# Patient Record
Sex: FEMALE | Race: OTHER | NOT HISPANIC OR LATINO
[De-identification: names, ages, dates, MRNs, and addresses within clinical notes are randomized per-mention and may not be internally consistent; named-entity substitution may affect disease eponyms.]

---

## 2018-08-03 PROBLEM — Z00.00 ENCOUNTER FOR PREVENTIVE HEALTH EXAMINATION: Status: ACTIVE | Noted: 2018-08-03

## 2018-08-07 ENCOUNTER — APPOINTMENT (OUTPATIENT)
Dept: SURGERY | Facility: CLINIC | Age: 48
End: 2018-08-07
Payer: MEDICAID

## 2018-08-07 VITALS
WEIGHT: 213 LBS | BODY MASS INDEX: 34.23 KG/M2 | HEIGHT: 66 IN | HEART RATE: 62 BPM | DIASTOLIC BLOOD PRESSURE: 91 MMHG | TEMPERATURE: 98.3 F | SYSTOLIC BLOOD PRESSURE: 145 MMHG

## 2018-08-07 DIAGNOSIS — C50.919 MALIGNANT NEOPLASM OF UNSPECIFIED SITE OF UNSPECIFIED FEMALE BREAST: ICD-10-CM

## 2018-08-07 LAB
BUN SERPL-MCNC: 6 MG/DL
CREAT SERPL-MCNC: 0.8 MG/DL

## 2018-08-07 PROCEDURE — 99215 OFFICE O/P EST HI 40 MIN: CPT

## 2018-08-07 RX ORDER — CHOLECALCIFEROL (VITAMIN D3) 25 MCG
TABLET ORAL
Refills: 0 | Status: ACTIVE | COMMUNITY

## 2018-08-17 ENCOUNTER — OUTPATIENT (OUTPATIENT)
Dept: OUTPATIENT SERVICES | Facility: HOSPITAL | Age: 48
LOS: 1 days | End: 2018-08-17
Payer: MEDICAID

## 2018-08-17 VITALS
TEMPERATURE: 98 F | HEIGHT: 66 IN | SYSTOLIC BLOOD PRESSURE: 143 MMHG | RESPIRATION RATE: 16 BRPM | OXYGEN SATURATION: 99 % | HEART RATE: 63 BPM | DIASTOLIC BLOOD PRESSURE: 91 MMHG | WEIGHT: 207.01 LBS

## 2018-08-17 DIAGNOSIS — C50.912 MALIGNANT NEOPLASM OF UNSPECIFIED SITE OF LEFT FEMALE BREAST: ICD-10-CM

## 2018-08-17 DIAGNOSIS — Z98.890 OTHER SPECIFIED POSTPROCEDURAL STATES: Chronic | ICD-10-CM

## 2018-08-17 DIAGNOSIS — Z90.49 ACQUIRED ABSENCE OF OTHER SPECIFIED PARTS OF DIGESTIVE TRACT: Chronic | ICD-10-CM

## 2018-08-17 DIAGNOSIS — I10 ESSENTIAL (PRIMARY) HYPERTENSION: ICD-10-CM

## 2018-08-17 DIAGNOSIS — R89.7 ABNORMAL HISTOLOGICAL FINDINGS IN SPECIMENS FROM OTHER ORGANS, SYSTEMS AND TISSUES: Chronic | ICD-10-CM

## 2018-08-17 DIAGNOSIS — Z01.818 ENCOUNTER FOR OTHER PREPROCEDURAL EXAMINATION: ICD-10-CM

## 2018-08-17 DIAGNOSIS — Z98.891 HISTORY OF UTERINE SCAR FROM PREVIOUS SURGERY: Chronic | ICD-10-CM

## 2018-08-17 LAB
ALBUMIN SERPL ELPH-MCNC: 3.8 G/DL — SIGNIFICANT CHANGE UP (ref 3.5–5)
ALP SERPL-CCNC: 63 U/L — SIGNIFICANT CHANGE UP (ref 40–120)
ALT FLD-CCNC: 27 U/L DA — SIGNIFICANT CHANGE UP (ref 10–60)
ANION GAP SERPL CALC-SCNC: 8 MMOL/L — SIGNIFICANT CHANGE UP (ref 5–17)
APTT BLD: 29.4 SEC — SIGNIFICANT CHANGE UP (ref 27.5–37.4)
AST SERPL-CCNC: 18 U/L — SIGNIFICANT CHANGE UP (ref 10–40)
BILIRUB SERPL-MCNC: 0.5 MG/DL — SIGNIFICANT CHANGE UP (ref 0.2–1.2)
BUN SERPL-MCNC: 6 MG/DL — LOW (ref 7–18)
CALCIUM SERPL-MCNC: 9.1 MG/DL — SIGNIFICANT CHANGE UP (ref 8.4–10.5)
CHLORIDE SERPL-SCNC: 108 MMOL/L — SIGNIFICANT CHANGE UP (ref 96–108)
CO2 SERPL-SCNC: 25 MMOL/L — SIGNIFICANT CHANGE UP (ref 22–31)
CREAT SERPL-MCNC: 0.9 MG/DL — SIGNIFICANT CHANGE UP (ref 0.5–1.3)
GLUCOSE SERPL-MCNC: 84 MG/DL — SIGNIFICANT CHANGE UP (ref 70–99)
HCG SERPL-ACNC: <1 MIU/ML — SIGNIFICANT CHANGE UP
HCT VFR BLD CALC: 36.3 % — SIGNIFICANT CHANGE UP (ref 34.5–45)
HGB BLD-MCNC: 11 G/DL — LOW (ref 11.5–15.5)
INR BLD: 1.08 RATIO — SIGNIFICANT CHANGE UP (ref 0.88–1.16)
MCHC RBC-ENTMCNC: 22.1 PG — LOW (ref 27–34)
MCHC RBC-ENTMCNC: 30.3 GM/DL — LOW (ref 32–36)
MCV RBC AUTO: 72.9 FL — LOW (ref 80–100)
PLATELET # BLD AUTO: 569 K/UL — HIGH (ref 150–400)
POTASSIUM SERPL-MCNC: 4.1 MMOL/L — SIGNIFICANT CHANGE UP (ref 3.5–5.3)
POTASSIUM SERPL-SCNC: 4.1 MMOL/L — SIGNIFICANT CHANGE UP (ref 3.5–5.3)
PROT SERPL-MCNC: 7.5 G/DL — SIGNIFICANT CHANGE UP (ref 6–8.3)
PROTHROM AB SERPL-ACNC: 11.8 SEC — SIGNIFICANT CHANGE UP (ref 9.8–12.7)
RBC # BLD: 4.98 M/UL — SIGNIFICANT CHANGE UP (ref 3.8–5.2)
RBC # FLD: 16.1 % — HIGH (ref 10.3–14.5)
SODIUM SERPL-SCNC: 141 MMOL/L — SIGNIFICANT CHANGE UP (ref 135–145)
WBC # BLD: 8.7 K/UL — SIGNIFICANT CHANGE UP (ref 3.8–10.5)
WBC # FLD AUTO: 8.7 K/UL — SIGNIFICANT CHANGE UP (ref 3.8–10.5)

## 2018-08-17 PROCEDURE — 71046 X-RAY EXAM CHEST 2 VIEWS: CPT | Mod: 26

## 2018-08-17 RX ORDER — SODIUM CHLORIDE 9 MG/ML
3 INJECTION INTRAMUSCULAR; INTRAVENOUS; SUBCUTANEOUS EVERY 8 HOURS
Refills: 0 | Status: DISCONTINUED | OUTPATIENT
Start: 2018-09-14 | End: 2018-09-22

## 2018-08-17 NOTE — H&P PST ADULT - PROBLEM SELECTOR PLAN 1
Scheduled for left breast lumpectomy with preoperative needle localization and axillary sentinel node biopsy on 8/22/2018

## 2018-08-17 NOTE — H&P PST ADULT - NSANTHOSAYNRD_GEN_A_CORE
No. GERARDO screening performed.  STOP BANG Legend: 0-2 = LOW Risk; 3-4 = INTERMEDIATE Risk; 5-8 = HIGH Risk

## 2018-08-17 NOTE — H&P PST ADULT - ASSESSMENT
46 y/o female with PMH of hypertension (diet controlled) and obesity diagnosed with malignant neoplasm of left breast scheduled for left breast lumpectomy with preoperative needle localization and axillary sentinel node biopsy on 8/22/2018

## 2018-08-17 NOTE — H&P PST ADULT - HISTORY OF PRESENT ILLNESS
48 y/o female with PMH of     is here today for presurgical evaluation. She was diagnosed with malignant neoplasm of left breast and is scheduled for left breast lumpectomy with preoperative needle localization and axillary sentinel node biopsy on 8/22/2018 46 y/o female with PMH of hypertension and obesity is here today for presurgical evaluation. She was diagnosed with malignant neoplasm of left breast and is scheduled for left breast lumpectomy with preoperative needle localization and axillary sentinel node biopsy on 8/22/2018 48 y/o female with PMH of hypertension (diet controlled) and obesity is here today for presurgical evaluation. She was diagnosed with malignant neoplasm of left breast and is scheduled for left breast lumpectomy with preoperative needle localization and axillary sentinel node biopsy on 8/22/2018

## 2018-08-17 NOTE — H&P PST ADULT - RS GEN PE MLT RESP DETAILS PC
normal/no subcutaneous emphysema/clear to auscultation bilaterally/no wheezes/breath sounds equal/respirations non-labored/airway patent/good air movement/no chest wall tenderness/no intercostal retractions/no rales/no rhonchi

## 2018-08-17 NOTE — H&P PST ADULT - NEGATIVE GENERAL SYMPTOMS
no sweating/no anorexia/no fever/no chills/no weight loss/no weight gain/no polyuria/no malaise/no polyphagia/no polydipsia/no fatigue

## 2018-08-17 NOTE — H&P PST ADULT - PMH
Malignant neoplasm of left female breast, unspecified estrogen receptor status, unspecified site of breast Essential hypertension    Malignant neoplasm of left female breast, unspecified estrogen receptor status, unspecified site of breast
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
## 2018-08-31 ENCOUNTER — TRANSCRIPTION ENCOUNTER (OUTPATIENT)
Age: 48
End: 2018-08-31

## 2018-09-13 ENCOUNTER — TRANSCRIPTION ENCOUNTER (OUTPATIENT)
Age: 48
End: 2018-09-13

## 2018-09-14 ENCOUNTER — RESULT REVIEW (OUTPATIENT)
Age: 48
End: 2018-09-14

## 2018-09-14 ENCOUNTER — OUTPATIENT (OUTPATIENT)
Dept: OUTPATIENT SERVICES | Facility: HOSPITAL | Age: 48
LOS: 1 days | End: 2018-09-14
Payer: MEDICAID

## 2018-09-14 ENCOUNTER — APPOINTMENT (OUTPATIENT)
Dept: SURGERY | Facility: HOSPITAL | Age: 48
End: 2018-09-14

## 2018-09-14 VITALS
SYSTOLIC BLOOD PRESSURE: 155 MMHG | TEMPERATURE: 98 F | RESPIRATION RATE: 16 BRPM | HEART RATE: 59 BPM | DIASTOLIC BLOOD PRESSURE: 80 MMHG | WEIGHT: 207.01 LBS | OXYGEN SATURATION: 100 % | HEIGHT: 66 IN

## 2018-09-14 VITALS
TEMPERATURE: 98 F | HEART RATE: 70 BPM | SYSTOLIC BLOOD PRESSURE: 126 MMHG | OXYGEN SATURATION: 99 % | RESPIRATION RATE: 18 BRPM | DIASTOLIC BLOOD PRESSURE: 74 MMHG

## 2018-09-14 DIAGNOSIS — Z98.890 OTHER SPECIFIED POSTPROCEDURAL STATES: Chronic | ICD-10-CM

## 2018-09-14 DIAGNOSIS — Z01.818 ENCOUNTER FOR OTHER PREPROCEDURAL EXAMINATION: ICD-10-CM

## 2018-09-14 DIAGNOSIS — R89.7 ABNORMAL HISTOLOGICAL FINDINGS IN SPECIMENS FROM OTHER ORGANS, SYSTEMS AND TISSUES: Chronic | ICD-10-CM

## 2018-09-14 DIAGNOSIS — Z98.891 HISTORY OF UTERINE SCAR FROM PREVIOUS SURGERY: Chronic | ICD-10-CM

## 2018-09-14 DIAGNOSIS — C50.912 MALIGNANT NEOPLASM OF UNSPECIFIED SITE OF LEFT FEMALE BREAST: ICD-10-CM

## 2018-09-14 DIAGNOSIS — Z90.49 ACQUIRED ABSENCE OF OTHER SPECIFIED PARTS OF DIGESTIVE TRACT: Chronic | ICD-10-CM

## 2018-09-14 LAB — HCG UR QL: NEGATIVE — SIGNIFICANT CHANGE UP

## 2018-09-14 PROCEDURE — 38740 REMOVE ARMPIT LYMPH NODES: CPT | Mod: 59

## 2018-09-14 PROCEDURE — 19301 PARTIAL MASTECTOMY: CPT | Mod: LT

## 2018-09-14 PROCEDURE — 88333 PATH CONSLTJ SURG CYTO XM 1: CPT | Mod: 26

## 2018-09-14 PROCEDURE — 88341 IMHCHEM/IMCYTCHM EA ADD ANTB: CPT | Mod: 26

## 2018-09-14 PROCEDURE — 38790 INJECT FOR LYMPHATIC X-RAY: CPT | Mod: 59

## 2018-09-14 PROCEDURE — 78195 LYMPH SYSTEM IMAGING: CPT | Mod: 26

## 2018-09-14 PROCEDURE — 38792 RA TRACER ID OF SENTINL NODE: CPT | Mod: 59

## 2018-09-14 PROCEDURE — 88342 IMHCHEM/IMCYTCHM 1ST ANTB: CPT | Mod: 26

## 2018-09-14 PROCEDURE — 76098 X-RAY EXAM SURGICAL SPECIMEN: CPT | Mod: 26

## 2018-09-14 PROCEDURE — 38900 IO MAP OF SENT LYMPH NODE: CPT

## 2018-09-14 PROCEDURE — 19281 PERQ DEVICE BREAST 1ST IMAG: CPT | Mod: LT

## 2018-09-14 PROCEDURE — 88307 TISSUE EXAM BY PATHOLOGIST: CPT | Mod: 26

## 2018-09-14 RX ORDER — HYDROMORPHONE HYDROCHLORIDE 2 MG/ML
0.5 INJECTION INTRAMUSCULAR; INTRAVENOUS; SUBCUTANEOUS
Refills: 0 | Status: DISCONTINUED | OUTPATIENT
Start: 2018-09-14 | End: 2018-09-14

## 2018-09-14 RX ORDER — ONDANSETRON 8 MG/1
4 TABLET, FILM COATED ORAL ONCE
Refills: 0 | Status: COMPLETED | OUTPATIENT
Start: 2018-09-14 | End: 2018-09-14

## 2018-09-14 RX ORDER — SODIUM CHLORIDE 9 MG/ML
1000 INJECTION, SOLUTION INTRAVENOUS
Refills: 0 | Status: DISCONTINUED | OUTPATIENT
Start: 2018-09-14 | End: 2018-09-14

## 2018-09-14 RX ORDER — ACETAMINOPHEN 500 MG
1000 TABLET ORAL ONCE
Refills: 0 | Status: COMPLETED | OUTPATIENT
Start: 2018-09-14 | End: 2018-09-14

## 2018-09-14 RX ADMIN — ONDANSETRON 4 MILLIGRAM(S): 8 TABLET, FILM COATED ORAL at 17:06

## 2018-09-14 RX ADMIN — Medication 400 MILLIGRAM(S): at 15:06

## 2018-09-14 RX ADMIN — HYDROMORPHONE HYDROCHLORIDE 0.5 MILLIGRAM(S): 2 INJECTION INTRAMUSCULAR; INTRAVENOUS; SUBCUTANEOUS at 15:21

## 2018-09-14 RX ADMIN — Medication 1000 MILLIGRAM(S): at 15:36

## 2018-09-14 RX ADMIN — SODIUM CHLORIDE 3 MILLILITER(S): 9 INJECTION INTRAMUSCULAR; INTRAVENOUS; SUBCUTANEOUS at 09:33

## 2018-09-14 RX ADMIN — HYDROMORPHONE HYDROCHLORIDE 0.5 MILLIGRAM(S): 2 INJECTION INTRAMUSCULAR; INTRAVENOUS; SUBCUTANEOUS at 15:36

## 2018-09-14 NOTE — ASU DISCHARGE PLAN (ADULT/PEDIATRIC). - NOTIFY
Swelling that continues/Pain not relieved by Medications/Bleeding that does not stop/Fever greater than 101
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
## 2018-09-14 NOTE — BRIEF OPERATIVE NOTE - PROCEDURE
<<-----Click on this checkbox to enter Procedure Lumpectomy for malignant neoplasm of breast  09/14/2018    Active  SAMY

## 2018-09-18 ENCOUNTER — APPOINTMENT (OUTPATIENT)
Dept: SURGERY | Facility: CLINIC | Age: 48
End: 2018-09-18
Payer: MEDICAID

## 2018-09-18 PROCEDURE — 99024 POSTOP FOLLOW-UP VISIT: CPT

## 2018-09-19 LAB — SURGICAL PATHOLOGY STUDY: SIGNIFICANT CHANGE UP

## 2019-03-12 ENCOUNTER — TRANSCRIPTION ENCOUNTER (OUTPATIENT)
Age: 49
End: 2019-03-12

## 2019-07-31 ENCOUNTER — INPATIENT (INPATIENT)
Facility: HOSPITAL | Age: 49
LOS: 9 days | Discharge: HOME | DRG: 300 | End: 2019-08-10
Attending: STUDENT IN AN ORGANIZED HEALTH CARE EDUCATION/TRAINING PROGRAM | Admitting: HOSPITALIST
Payer: MEDICAID

## 2019-07-31 VITALS
RESPIRATION RATE: 17 BRPM | WEIGHT: 205.91 LBS | DIASTOLIC BLOOD PRESSURE: 83 MMHG | OXYGEN SATURATION: 99 % | SYSTOLIC BLOOD PRESSURE: 133 MMHG | HEIGHT: 66 IN | TEMPERATURE: 98 F | HEART RATE: 72 BPM

## 2019-07-31 DIAGNOSIS — R89.7 ABNORMAL HISTOLOGICAL FINDINGS IN SPECIMENS FROM OTHER ORGANS, SYSTEMS AND TISSUES: Chronic | ICD-10-CM

## 2019-07-31 DIAGNOSIS — I70.90 UNSPECIFIED ATHEROSCLEROSIS: ICD-10-CM

## 2019-07-31 DIAGNOSIS — Z98.890 OTHER SPECIFIED POSTPROCEDURAL STATES: Chronic | ICD-10-CM

## 2019-07-31 DIAGNOSIS — Z98.891 HISTORY OF UTERINE SCAR FROM PREVIOUS SURGERY: Chronic | ICD-10-CM

## 2019-07-31 DIAGNOSIS — Z90.49 ACQUIRED ABSENCE OF OTHER SPECIFIED PARTS OF DIGESTIVE TRACT: Chronic | ICD-10-CM

## 2019-07-31 LAB
ALBUMIN SERPL ELPH-MCNC: 4.1 G/DL — SIGNIFICANT CHANGE UP (ref 3.3–5)
ALP SERPL-CCNC: 47 U/L — SIGNIFICANT CHANGE UP (ref 40–120)
ALT FLD-CCNC: 19 U/L — SIGNIFICANT CHANGE UP (ref 10–45)
ANION GAP SERPL CALC-SCNC: 12 MMOL/L — SIGNIFICANT CHANGE UP (ref 5–17)
APTT BLD: 26.7 SEC — LOW (ref 27.5–36.3)
AST SERPL-CCNC: 15 U/L — SIGNIFICANT CHANGE UP (ref 10–40)
BASOPHILS # BLD AUTO: 0 K/UL — SIGNIFICANT CHANGE UP (ref 0–0.2)
BASOPHILS NFR BLD AUTO: 0.1 % — SIGNIFICANT CHANGE UP (ref 0–2)
BILIRUB SERPL-MCNC: 0.3 MG/DL — SIGNIFICANT CHANGE UP (ref 0.2–1.2)
BLD GP AB SCN SERPL QL: NEGATIVE — SIGNIFICANT CHANGE UP
BUN SERPL-MCNC: 9 MG/DL — SIGNIFICANT CHANGE UP (ref 7–23)
CALCIUM SERPL-MCNC: 9.3 MG/DL — SIGNIFICANT CHANGE UP (ref 8.4–10.5)
CHLORIDE SERPL-SCNC: 104 MMOL/L — SIGNIFICANT CHANGE UP (ref 96–108)
CO2 SERPL-SCNC: 24 MMOL/L — SIGNIFICANT CHANGE UP (ref 22–31)
CREAT SERPL-MCNC: 0.82 MG/DL — SIGNIFICANT CHANGE UP (ref 0.5–1.3)
EOSINOPHIL # BLD AUTO: 0.1 K/UL — SIGNIFICANT CHANGE UP (ref 0–0.5)
EOSINOPHIL NFR BLD AUTO: 1.4 % — SIGNIFICANT CHANGE UP (ref 0–6)
GAS PNL BLDV: SIGNIFICANT CHANGE UP
GLUCOSE SERPL-MCNC: 94 MG/DL — SIGNIFICANT CHANGE UP (ref 70–99)
HCT VFR BLD CALC: 42 % — SIGNIFICANT CHANGE UP (ref 34.5–45)
HGB BLD-MCNC: 13.8 G/DL — SIGNIFICANT CHANGE UP (ref 11.5–15.5)
INR BLD: 0.96 RATIO — SIGNIFICANT CHANGE UP (ref 0.88–1.16)
LYMPHOCYTES # BLD AUTO: 2.5 K/UL — SIGNIFICANT CHANGE UP (ref 1–3.3)
LYMPHOCYTES # BLD AUTO: 28.1 % — SIGNIFICANT CHANGE UP (ref 13–44)
MCHC RBC-ENTMCNC: 27.1 PG — SIGNIFICANT CHANGE UP (ref 27–34)
MCHC RBC-ENTMCNC: 32.8 GM/DL — SIGNIFICANT CHANGE UP (ref 32–36)
MCV RBC AUTO: 82.5 FL — SIGNIFICANT CHANGE UP (ref 80–100)
MONOCYTES # BLD AUTO: 0.7 K/UL — SIGNIFICANT CHANGE UP (ref 0–0.9)
MONOCYTES NFR BLD AUTO: 8.1 % — SIGNIFICANT CHANGE UP (ref 2–14)
NEUTROPHILS # BLD AUTO: 5.6 K/UL — SIGNIFICANT CHANGE UP (ref 1.8–7.4)
NEUTROPHILS NFR BLD AUTO: 62.4 % — SIGNIFICANT CHANGE UP (ref 43–77)
PLATELET # BLD AUTO: 382 K/UL — SIGNIFICANT CHANGE UP (ref 150–400)
POTASSIUM SERPL-MCNC: 4.3 MMOL/L — SIGNIFICANT CHANGE UP (ref 3.5–5.3)
POTASSIUM SERPL-SCNC: 4.3 MMOL/L — SIGNIFICANT CHANGE UP (ref 3.5–5.3)
PROT SERPL-MCNC: 6.8 G/DL — SIGNIFICANT CHANGE UP (ref 6–8.3)
PROTHROM AB SERPL-ACNC: 10.9 SEC — SIGNIFICANT CHANGE UP (ref 10–12.9)
RBC # BLD: 5.1 M/UL — SIGNIFICANT CHANGE UP (ref 3.8–5.2)
RBC # FLD: 13.3 % — SIGNIFICANT CHANGE UP (ref 10.3–14.5)
RH IG SCN BLD-IMP: POSITIVE — SIGNIFICANT CHANGE UP
SODIUM SERPL-SCNC: 140 MMOL/L — SIGNIFICANT CHANGE UP (ref 135–145)
WBC # BLD: 9 K/UL — SIGNIFICANT CHANGE UP (ref 3.8–10.5)
WBC # FLD AUTO: 9 K/UL — SIGNIFICANT CHANGE UP (ref 3.8–10.5)

## 2019-07-31 PROCEDURE — 93010 ELECTROCARDIOGRAM REPORT: CPT

## 2019-07-31 PROCEDURE — 71045 X-RAY EXAM CHEST 1 VIEW: CPT | Mod: 26

## 2019-07-31 PROCEDURE — 99291 CRITICAL CARE FIRST HOUR: CPT

## 2019-07-31 RX ORDER — HEPARIN SODIUM 5000 [USP'U]/ML
7500 INJECTION INTRAVENOUS; SUBCUTANEOUS ONCE
Refills: 0 | Status: COMPLETED | OUTPATIENT
Start: 2019-07-31 | End: 2019-07-31

## 2019-07-31 RX ORDER — HEPARIN SODIUM 5000 [USP'U]/ML
7500 INJECTION INTRAVENOUS; SUBCUTANEOUS EVERY 6 HOURS
Refills: 0 | Status: DISCONTINUED | OUTPATIENT
Start: 2019-07-31 | End: 2019-08-01

## 2019-07-31 RX ORDER — HEPARIN SODIUM 5000 [USP'U]/ML
3500 INJECTION INTRAVENOUS; SUBCUTANEOUS EVERY 6 HOURS
Refills: 0 | Status: DISCONTINUED | OUTPATIENT
Start: 2019-07-31 | End: 2019-08-01

## 2019-07-31 RX ORDER — HEPARIN SODIUM 5000 [USP'U]/ML
INJECTION INTRAVENOUS; SUBCUTANEOUS
Qty: 25000 | Refills: 0 | Status: DISCONTINUED | OUTPATIENT
Start: 2019-07-31 | End: 2019-08-01

## 2019-07-31 RX ADMIN — HEPARIN SODIUM 1700 UNIT(S)/HR: 5000 INJECTION INTRAVENOUS; SUBCUTANEOUS at 19:12

## 2019-07-31 RX ADMIN — HEPARIN SODIUM 7500 UNIT(S): 5000 INJECTION INTRAVENOUS; SUBCUTANEOUS at 19:12

## 2019-07-31 NOTE — ED ADULT NURSE REASSESSMENT NOTE - NS ED NURSE REASSESS COMMENT FT1
Pt is resting comfortably, VSS, skin w/d/i, NAD.  Family present at bedside.  Safety and comfort maintained.  Pt pending being admitted.  Heparin Started with 2 RNs at bedside.  Will continue to monitor.

## 2019-07-31 NOTE — ED ADULT NURSE NOTE - OBJECTIVE STATEMENT
49 y/o female PMH HTN and breast cancer presents to ED reporting R leg pain. 47 y/o female PMH HTN and breast cancer presents to ED reporting R leg pain. Pt reports having numbness and toes turning white on R leg so getting US and was sent directly to ED for evaluation. On exam, AOx3, speaking in complete sentences. Lung sounds CTA, NAD. Abdomen soft, non-tender, non-distended. Pt denies CP, SOB, n/v/d, fever/chills at this time. Unable to get doppler pulse of R foot, MD Bearden at bedside, capillary refill greater than 3 seconds. Heplock placed, labs sent. Seen and evaluated by MD.

## 2019-07-31 NOTE — ED PROVIDER NOTE - OBJECTIVE STATEMENT
47 y/o female hx breast cancer on tamoxifen who presents to the ED for abnormal outpatient US showing long segment occlusion of the mid and distal right superficial femoral artery. patient states she has been having Right foot pain and color change intermittently for about 2 months, states her toes turn white and sometimes blue. symptoms improve/resolve with rest and get worse with walking around. her OB/GYN recommended outpatient doppler which was done today. patient sent in for vascular consultation.

## 2019-07-31 NOTE — ED PROVIDER NOTE - ATTENDING CONTRIBUTION TO CARE
47 yo F presents with R lower leg intermittent numbness/coldness/paleness for 2 months, and pain with ambulation. she was told initilly it was sciatica. went to PMD for furhter workup who arranged outpatient US. US arterial showed long segment occlusion of the mid and distal R sup fem artery and R popliteal artery with collateral flow distally in the posterior tibial artery. No flow seen in the R anterior tibial, peroneal, and dorsalis pedis arteries.   RLE pain from foot to calf with ambulation, resolved with rest. numbness ad cold foot intermittently.   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 lungs CTA. abd soft and NT. no leg swelling. no palpable DP/PT pulses in R foot. R foot colder than the L foot. sensation/strength intact. 47 yo F presents with R lower leg intermittent numbness/coldness/paleness for 2 months, and pain with ambulation. she was told initilly it was sciatica. went to PMD for furhter workup who arranged outpatient US. US arterial showed long segment occlusion of the mid and distal R sup fem artery and R popliteal artery with collateral flow distally in the posterior tibial artery. No flow seen in the R anterior tibial, peroneal, and dorsalis pedis arteries.   RLE pain from foot to calf with ambulation, resolved with rest. numbness ad cold foot intermittently.   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 lungs CTA. abd soft and NT. no leg swelling. no palpable DP/PT pulses in R foot. R foot colder than the L foot. sensation/strength intact.    unable to obtain pulses with doppler. vascular surgery was consulted urgently. heparin drip was started. vascular believes that this is a chronic process and pt has developed collaterals with perfusion of foot/toes at this time, does not believe pt requires urgent OR. labs nonactionable. vs stable. surgery asked tht pt be admitted to medicine, pt was admitted in stable condition    I reviewed all lab and imaging results from this ED visit, and discussed ALL results with the patient and/or family, including all abnormal results and incidental findings. I addressed all of patient's/family's questions and concerns, and I recommended appropriate follow up for all incidental findings. Based on patient's HPI as well as the results of today's workup, I felt that patient warranted admission to the hospital for further workup/evaluation and continued management. The patient was agreeable with admission. Patient was signed out to admitting team. Admitting team accepted the patient for admission and subsequently took over the patient's care in its entirety.

## 2019-07-31 NOTE — ED PROVIDER NOTE - PROGRESS NOTE DETAILS
vascular surgery feels patient should have heparin and then hypercoagulable/vasculitis workup with heme/onc eval. - Kyung Lai PA-C

## 2019-07-31 NOTE — ED PROVIDER NOTE - CRITICAL CARE PROVIDED
direct patient care (not related to procedure)/additional history taking/documentation/consultation with other physicians/interpretation of diagnostic studies
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
## 2019-08-01 ENCOUNTER — OUTPATIENT (OUTPATIENT)
Dept: OUTPATIENT SERVICES | Facility: HOSPITAL | Age: 49
LOS: 1 days | End: 2019-08-01
Payer: MEDICAID

## 2019-08-01 DIAGNOSIS — R89.7 ABNORMAL HISTOLOGICAL FINDINGS IN SPECIMENS FROM OTHER ORGANS, SYSTEMS AND TISSUES: Chronic | ICD-10-CM

## 2019-08-01 DIAGNOSIS — Z98.890 OTHER SPECIFIED POSTPROCEDURAL STATES: Chronic | ICD-10-CM

## 2019-08-01 DIAGNOSIS — I70.90 UNSPECIFIED ATHEROSCLEROSIS: ICD-10-CM

## 2019-08-01 DIAGNOSIS — Z90.49 ACQUIRED ABSENCE OF OTHER SPECIFIED PARTS OF DIGESTIVE TRACT: Chronic | ICD-10-CM

## 2019-08-01 DIAGNOSIS — I10 ESSENTIAL (PRIMARY) HYPERTENSION: ICD-10-CM

## 2019-08-01 DIAGNOSIS — Z29.9 ENCOUNTER FOR PROPHYLACTIC MEASURES, UNSPECIFIED: ICD-10-CM

## 2019-08-01 DIAGNOSIS — Z98.891 HISTORY OF UTERINE SCAR FROM PREVIOUS SURGERY: Chronic | ICD-10-CM

## 2019-08-01 DIAGNOSIS — C50.912 MALIGNANT NEOPLASM OF UNSPECIFIED SITE OF LEFT FEMALE BREAST: ICD-10-CM

## 2019-08-01 LAB
ALBUMIN SERPL ELPH-MCNC: 3.8 G/DL — SIGNIFICANT CHANGE UP (ref 3.3–5)
ALP SERPL-CCNC: 44 U/L — SIGNIFICANT CHANGE UP (ref 40–120)
ALT FLD-CCNC: 19 U/L — SIGNIFICANT CHANGE UP (ref 10–45)
ANION GAP SERPL CALC-SCNC: 14 MMOL/L — SIGNIFICANT CHANGE UP (ref 5–17)
APPEARANCE UR: ABNORMAL
APTT BLD: 106.4 SEC — HIGH (ref 27.5–36.3)
APTT BLD: 66.3 SEC — HIGH (ref 27.5–36.3)
APTT BLD: 77.1 SEC — HIGH (ref 27.5–36.3)
AST SERPL-CCNC: 18 U/L — SIGNIFICANT CHANGE UP (ref 10–40)
BILIRUB SERPL-MCNC: 0.4 MG/DL — SIGNIFICANT CHANGE UP (ref 0.2–1.2)
BILIRUB UR-MCNC: NEGATIVE — SIGNIFICANT CHANGE UP
BUN SERPL-MCNC: 7 MG/DL — SIGNIFICANT CHANGE UP (ref 7–23)
CALCIUM SERPL-MCNC: 9.4 MG/DL — SIGNIFICANT CHANGE UP (ref 8.4–10.5)
CHLORIDE SERPL-SCNC: 104 MMOL/L — SIGNIFICANT CHANGE UP (ref 96–108)
CO2 SERPL-SCNC: 23 MMOL/L — SIGNIFICANT CHANGE UP (ref 22–31)
COLOR SPEC: SIGNIFICANT CHANGE UP
CREAT SERPL-MCNC: 0.8 MG/DL — SIGNIFICANT CHANGE UP (ref 0.5–1.3)
DIFF PNL FLD: NEGATIVE — SIGNIFICANT CHANGE UP
GLUCOSE SERPL-MCNC: 91 MG/DL — SIGNIFICANT CHANGE UP (ref 70–99)
GLUCOSE UR QL: NEGATIVE — SIGNIFICANT CHANGE UP
HCG UR QL: NEGATIVE — SIGNIFICANT CHANGE UP
HCT VFR BLD CALC: 39.6 % — SIGNIFICANT CHANGE UP (ref 34.5–45)
HCT VFR BLD CALC: 41.3 % — SIGNIFICANT CHANGE UP (ref 34.5–45)
HCYS SERPL-MCNC: 7.1 UMOL/L — SIGNIFICANT CHANGE UP
HGB BLD-MCNC: 13.3 G/DL — SIGNIFICANT CHANGE UP (ref 11.5–15.5)
HGB BLD-MCNC: 13.6 G/DL — SIGNIFICANT CHANGE UP (ref 11.5–15.5)
HIV 1+2 AB+HIV1 P24 AG SERPL QL IA: SIGNIFICANT CHANGE UP
KETONES UR-MCNC: NEGATIVE — SIGNIFICANT CHANGE UP
LEUKOCYTE ESTERASE UR-ACNC: ABNORMAL
MCHC RBC-ENTMCNC: 26.9 PG — LOW (ref 27–34)
MCHC RBC-ENTMCNC: 27.4 PG — SIGNIFICANT CHANGE UP (ref 27–34)
MCHC RBC-ENTMCNC: 32.8 GM/DL — SIGNIFICANT CHANGE UP (ref 32–36)
MCHC RBC-ENTMCNC: 33.5 GM/DL — SIGNIFICANT CHANGE UP (ref 32–36)
MCV RBC AUTO: 82 FL — SIGNIFICANT CHANGE UP (ref 80–100)
MCV RBC AUTO: 82 FL — SIGNIFICANT CHANGE UP (ref 80–100)
NITRITE UR-MCNC: NEGATIVE — SIGNIFICANT CHANGE UP
PH UR: 6 — SIGNIFICANT CHANGE UP (ref 5–8)
PLATELET # BLD AUTO: 334 K/UL — SIGNIFICANT CHANGE UP (ref 150–400)
PLATELET # BLD AUTO: 372 K/UL — SIGNIFICANT CHANGE UP (ref 150–400)
POTASSIUM SERPL-MCNC: 4.3 MMOL/L — SIGNIFICANT CHANGE UP (ref 3.5–5.3)
POTASSIUM SERPL-SCNC: 4.3 MMOL/L — SIGNIFICANT CHANGE UP (ref 3.5–5.3)
PROT SERPL-MCNC: 6.8 G/DL — SIGNIFICANT CHANGE UP (ref 6–8.3)
PROT UR-MCNC: NEGATIVE — SIGNIFICANT CHANGE UP
RBC # BLD: 4.83 M/UL — SIGNIFICANT CHANGE UP (ref 3.8–5.2)
RBC # BLD: 5.04 M/UL — SIGNIFICANT CHANGE UP (ref 3.8–5.2)
RBC # FLD: 13.2 % — SIGNIFICANT CHANGE UP (ref 10.3–14.5)
RBC # FLD: 13.2 % — SIGNIFICANT CHANGE UP (ref 10.3–14.5)
SODIUM SERPL-SCNC: 141 MMOL/L — SIGNIFICANT CHANGE UP (ref 135–145)
SP GR SPEC: 1.01 — SIGNIFICANT CHANGE UP (ref 1.01–1.02)
UROBILINOGEN FLD QL: NEGATIVE — SIGNIFICANT CHANGE UP
WBC # BLD: 6.3 K/UL — SIGNIFICANT CHANGE UP (ref 3.8–10.5)
WBC # BLD: 7.9 K/UL — SIGNIFICANT CHANGE UP (ref 3.8–10.5)
WBC # FLD AUTO: 6.3 K/UL — SIGNIFICANT CHANGE UP (ref 3.8–10.5)
WBC # FLD AUTO: 7.9 K/UL — SIGNIFICANT CHANGE UP (ref 3.8–10.5)

## 2019-08-01 PROCEDURE — 93010 ELECTROCARDIOGRAM REPORT: CPT

## 2019-08-01 PROCEDURE — 75635 CT ANGIO ABDOMINAL ARTERIES: CPT | Mod: 26

## 2019-08-01 PROCEDURE — G0452: CPT | Mod: 26

## 2019-08-01 PROCEDURE — 12345: CPT | Mod: NC,GC

## 2019-08-01 PROCEDURE — G9001: CPT

## 2019-08-01 PROCEDURE — 99223 1ST HOSP IP/OBS HIGH 75: CPT | Mod: GC

## 2019-08-01 PROCEDURE — 93923 UPR/LXTR ART STDY 3+ LVLS: CPT | Mod: 26

## 2019-08-01 RX ORDER — CHOLECALCIFEROL (VITAMIN D3) 125 MCG
1 CAPSULE ORAL
Qty: 0 | Refills: 0 | DISCHARGE

## 2019-08-01 RX ORDER — AMLODIPINE BESYLATE 2.5 MG/1
5 TABLET ORAL DAILY
Refills: 0 | Status: DISCONTINUED | OUTPATIENT
Start: 2019-08-01 | End: 2019-08-01

## 2019-08-01 RX ORDER — AMLODIPINE BESYLATE 2.5 MG/1
5 TABLET ORAL DAILY
Refills: 0 | Status: DISCONTINUED | OUTPATIENT
Start: 2019-08-01 | End: 2019-08-08

## 2019-08-01 RX ORDER — HEPARIN SODIUM 5000 [USP'U]/ML
3500 INJECTION INTRAVENOUS; SUBCUTANEOUS EVERY 6 HOURS
Refills: 0 | Status: DISCONTINUED | OUTPATIENT
Start: 2019-08-01 | End: 2019-08-06

## 2019-08-01 RX ORDER — SODIUM CHLORIDE 9 MG/ML
1000 INJECTION, SOLUTION INTRAVENOUS
Refills: 0 | Status: DISCONTINUED | OUTPATIENT
Start: 2019-08-01 | End: 2019-08-03

## 2019-08-01 RX ORDER — ASCORBIC ACID 60 MG
1 TABLET,CHEWABLE ORAL
Qty: 0 | Refills: 0 | DISCHARGE

## 2019-08-01 RX ORDER — HEPARIN SODIUM 5000 [USP'U]/ML
7500 INJECTION INTRAVENOUS; SUBCUTANEOUS EVERY 6 HOURS
Refills: 0 | Status: DISCONTINUED | OUTPATIENT
Start: 2019-08-01 | End: 2019-08-06

## 2019-08-01 RX ORDER — HEPARIN SODIUM 5000 [USP'U]/ML
1400 INJECTION INTRAVENOUS; SUBCUTANEOUS
Qty: 25000 | Refills: 0 | Status: DISCONTINUED | OUTPATIENT
Start: 2019-08-01 | End: 2019-08-06

## 2019-08-01 RX ADMIN — SODIUM CHLORIDE 50 MILLILITER(S): 9 INJECTION, SOLUTION INTRAVENOUS at 13:43

## 2019-08-01 RX ADMIN — HEPARIN SODIUM 1400 UNIT(S)/HR: 5000 INJECTION INTRAVENOUS; SUBCUTANEOUS at 17:29

## 2019-08-01 RX ADMIN — HEPARIN SODIUM 1400 UNIT(S)/HR: 5000 INJECTION INTRAVENOUS; SUBCUTANEOUS at 02:24

## 2019-08-01 RX ADMIN — HEPARIN SODIUM 1400 UNIT(S)/HR: 5000 INJECTION INTRAVENOUS; SUBCUTANEOUS at 11:12

## 2019-08-01 RX ADMIN — SODIUM CHLORIDE 50 MILLILITER(S): 9 INJECTION, SOLUTION INTRAVENOUS at 17:30

## 2019-08-01 NOTE — H&P ADULT - PROBLEM SELECTOR PLAN 2
Pt recently had an MRI that was negative for cancer in February, taking Tamoxifene 20 mg q Day  - Per outpatient oncologist, holding Tamoxifene Pt recently had an MRI that was negative for cancer in February, taking Tamoxifene 20 mg q Day  - Per outpatient oncologist, holding Tamoxifen Pt recently had an MRI that was negative for cancer in February, taking Tamoxifene 20 mg q Day  - Would hold Tamoxifen given that is it prothrombotic

## 2019-08-01 NOTE — H&P ADULT - NSHPLABSRESULTS_GEN_ALL_CORE
13.8   9.0   )-----------( 382      ( 31 Jul 2019 17:32 )             42.0       07-31    140  |  104  |  9   ----------------------------<  94  4.3   |  24  |  0.82    Ca    9.3      31 Jul 2019 17:32    TPro  6.8  /  Alb  4.1  /  TBili  0.3  /  DBili  x   /  AST  15  /  ALT  19  /  AlkPhos  47  07-31                  PT/INR - ( 31 Jul 2019 17:32 )   PT: 10.9 sec;   INR: 0.96 ratio         PTT - ( 31 Jul 2019 17:32 )  PTT:26.7 sec    Lactate Trend            CAPILLARY BLOOD GLUCOSE 13.8   9.0   )-----------( 382      ( 31 Jul 2019 17:32 )             42.0       07-31    140  |  104  |  9   ----------------------------<  94  4.3   |  24  |  0.82    Ca    9.3      31 Jul 2019 17:32    TPro  6.8  /  Alb  4.1  /  TBili  0.3  /  DBili  x   /  AST  15  /  ALT  19  /  AlkPhos  47  07-31                  PT/INR - ( 31 Jul 2019 17:32 )   PT: 10.9 sec;   INR: 0.96 ratio         PTT - ( 31 Jul 2019 17:32 )  PTT:26.7 sec    Lactate Trend          CAPILLARY BLOOD GLUCOSE    CXR shows clear lungs

## 2019-08-01 NOTE — PROGRESS NOTE ADULT - PROBLEM SELECTOR PLAN 1
Pt with a 4 mo hx of decreased perfusion and pain to the right leg, with ultrasound positive for occluded arterial flow in the right SFA and popliteal. Of note, patient's cancer screenings are up to date and patient on Tamoxifen for an early stage breast cancer s/p resection, which is prothrombic -> holding tamoxifen per oncologist Dr. Singer  - Vascular Surgery Consulted -> apparently no intervention at this time as they are concerned if she has an underlying prothrombotic state that any intervention with fail. Appreciate recs.  - CTA of the A/P with run off of lower extremities showed a large arterial clot in the right thigh  - will obtain echo to r/o thrombus, EKG wnl.  - Heparin gtt  - Heme/Onc consulted  - Hypercoagulable workup ordered and sent. However, in the setting of an acute clot, some tests may need to be repeated outpatient.  - Will get HCG, UA, UPC, and HIV and test for genetic and acquired hypercoagulable states. Pt with a 4 mo hx of decreased perfusion and pain to the right leg, with ultrasound positive for occluded arterial flow in the right SFA and popliteal. Of note, patient's cancer screenings are up to date and patient on Tamoxifen for an early stage breast cancer s/p resection, which is prothrombic -> holding tamoxifen per oncologist Dr. Singer  - Vascular Surgery Consulted -> apparently no intervention at this time as they are concerned if she has an underlying prothrombotic state that any intervention with fail. Appreciate recs.  - CTA of the A/P with run off of lower extremities showed a large arterial clot in the right thigh  - will obtain echo to r/o thrombus, EKG wnl.  - Heparin gtt  - Heme/Onc consulted  - Hypercoagulable workup ordered and sent. However, in the setting of an acute clot, some tests may need to be repeated outpatient.  - Will get HCG, UA, UPC, and HIV and test for genetic and acquired hypercoagulable states

## 2019-08-01 NOTE — PROGRESS NOTE ADULT - PROBLEM SELECTOR PLAN 2
Pt recently had an MRI that was negative for cancer in February, taking Tamoxifen 20 mg q Day  - hold Tamoxifen given that is it prothrombotic per oncologist
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
## 2019-08-01 NOTE — PROGRESS NOTE ADULT - SUBJECTIVE AND OBJECTIVE BOX
Irwin Chaney MD  Internal Medicine Resident  [phone]    PATIENT:  BRANDON MARTINEZ  [id number]    CHIEF COMPLAINT:  Patient is a 48y old  Female who presents with a chief complaint of Right Leg Pain (01 Aug 2019 10:21)      INTERVAL HISTORY/OVERNIGHT EVENTS:  Overnight some tingling in right leg, a little colder than left, but improved with stretching.     REVIEW OF SYSTEMS:  CONSTITUTIONAL: No weakness, fevers or chills  EYES: No visual changes;    ENT: No vertigo or throat pain   NECK: No pain or stiffness  RESPIRATORY: No cough, wheezing, hemoptysis; No shortness of breath  CARDIOVASCULAR: No chest pain or palpitations  GASTROINTESTINAL: No abdominal or epigastric pain. No nausea, vomiting, or hematemesis; No diarrhea or constipation. No melena or hematochezia.  GENITOURINARY: No dysuria, frequency or hematuria  NEUROLOGICAL: No numbness or weakness  EXTREMITY: Cold, numbness, intermittent tingling  SKIN: No itching, rashes    MEDICATIONS:  MEDICATIONS  (STANDING):  amLODIPine   Tablet 5 milliGRAM(s) Oral daily  heparin  Infusion. 1400 Unit(s)/Hr (14 mL/Hr) IV Continuous <Continuous>  lactated ringers. 1000 milliLiter(s) (50 mL/Hr) IV Continuous <Continuous>    MEDICATIONS  (PRN):  heparin  Injectable 7500 Unit(s) IV Push every 6 hours PRN For aPTT less than 40  heparin  Injectable 3500 Unit(s) IV Push every 6 hours PRN For aPTT between 40 - 57    ALLERGIES:  Allergies  No Known Allergies    OBJECTIVE:  ICU Vital Signs Last 24 Hrs  T(C): 36.9 (01 Aug 2019 14:39), Max: 36.9 (01 Aug 2019 05:09)  T(F): 98.4 (01 Aug 2019 14:39), Max: 98.4 (01 Aug 2019 05:09)  HR: 53 (01 Aug 2019 14:39) (52 - 72)  BP: 117/78 (01 Aug 2019 14:39) (117/78 - 149/83)  RR: 18 (01 Aug 2019 14:39) (17 - 18)  SpO2: 100% (01 Aug 2019 14:39) (97% - 100%)    Daily Height in cm: 165.1 (2019 23:06)      PHYSICAL EXAMINATION:  GENERAL: NAD, well-developed  HEAD:  Atraumatic, Normocephalic  EYES: EOMI, PERRLA, conjunctiva and sclera clear  NECK: Supple, No JVD  CHEST/LUNG: Clear to auscultation bilaterally; No wheeze  HEART: Regular rate and rhythm; No murmurs, rubs, or gallops  ABDOMEN: Soft, Nontender, Nondistended; Bowel sounds present  EXTREMITIES:   Left Leg - palpable pulses in left foot, warmth to touch  Right leg - palpable popliteal pulse, but unable to palpation DP/TP; right foot seems slightly more pale than left; patient able to move both legs and feels sensation in tact  PSYCH: AAOx3  NEUROLOGY: non-focal  SKIN: No rashes or lesions    LABS:                          13.6   6.3   )-----------( 372      ( 01 Aug 2019 10:03 )             41.3     08-    141  |  104  |  7   ----------------------------<  91  4.3   |  23  |  0.80    Ca    9.4      01 Aug 2019 10:03    TPro  6.8  /  Alb  3.8  /  TBili  0.4  /  DBili  x   /  AST  18  /  ALT  19  /  AlkPhos  44  08-01    LIVER FUNCTIONS - ( 01 Aug 2019 10:03 )  Alb: 3.8 g/dL / Pro: 6.8 g/dL / ALK PHOS: 44 U/L / ALT: 19 U/L / AST: 18 U/L / GGT: x           PT/INR - ( 2019 17:32 )   PT: 10.9 sec;   INR: 0.96 ratio    PTT - ( 01 Aug 2019 10:03 )  PTT:66.3 sec    Urinalysis Basic - ( 01 Aug 2019 03:38 )    Color: Light Yellow / Appearance: Slightly Turbid / S.011 / pH: x  Gluc: x / Ketone: Negative  / Bili: Negative / Urobili: Negative   Blood: x / Protein: Negative / Nitrite: Negative   Leuk Esterase: Large / RBC: 2 /hpf / WBC 12 /HPF   Sq Epi: x / Non Sq Epi: 14 /hpf / Bacteria: Few

## 2019-08-01 NOTE — PROGRESS NOTE ADULT - ASSESSMENT
Patient is a 48y old f with occluded arterial flow in the right SFA and pop.    PLAN:   - CTA of A/P with runoff of the lower extremities, f/u results   - c/w heparin gtt   - ABIs/PVRs, f/u results       Susan Weber PA-C  [phone]

## 2019-08-01 NOTE — H&P ADULT - HISTORY OF PRESENT ILLNESS
49 yo female with a hx of DCIS s/p lumpectomy (currently on tamoxifen) and HTN who was sent to the ED after her PMD show an abnormal outpatient US. Patient initially presented to her PMD for right foot pain and intermittent color change over the past four months. She states that the pain occurs when she walks half a block. She also states that her right foot becomes cold once she lays down.  Her PMD sent her for an US to r/o DVTs. However, it was noticed she didn't have pulses. An arterial duplex of her lower extremities were performed at an OSH. The US showed a long segment occlusion of the middle and distal right superficial femoral artery. 47 yo female with a hx of DCIS s/p lumpectomy (currently on tamoxifen) and HTN who was sent to the ED after her PMD show an abnormal outpatient US. Patient initially presented to her PMD for right foot pain and intermittent color change over the past four months. She states that the pain occurs when she walks half a block. She also states that her right foot becomes cold once she lays down.  Her PMD sent her for an US to r/o DVTs. It was noted that she did not have pulses at radiology. An arterial duplex of her lower extremities were performed at an OSH. The US showed a long segment occlusion of the middle and distal right superficial femoral artery.

## 2019-08-01 NOTE — H&P ADULT - NSHPPHYSICALEXAM_GEN_ALL_CORE
Vital Signs Last 24 Hrs  T(C): 36.8 (31 Jul 2019 19:09), Max: 36.8 (31 Jul 2019 19:09)  T(F): 98.2 (31 Jul 2019 19:09), Max: 98.2 (31 Jul 2019 19:09)  HR: 60 (31 Jul 2019 19:09) (60 - 72)  BP: 139/84 (31 Jul 2019 19:09) (133/83 - 139/84)  BP(mean): --  RR: 18 (31 Jul 2019 19:09) (17 - 18)  SpO2: 98% (31 Jul 2019 19:09) (98% - 99%) Vital Signs Last 24 Hrs  T(C): 36.8 (31 Jul 2019 19:09), Max: 36.8 (31 Jul 2019 19:09)  T(F): 98.2 (31 Jul 2019 19:09), Max: 98.2 (31 Jul 2019 19:09)  HR: 60 (31 Jul 2019 19:09) (60 - 72)  BP: 139/84 (31 Jul 2019 19:09) (133/83 - 139/84)  BP(mean): --  RR: 18 (31 Jul 2019 19:09) (17 - 18)  SpO2: 98% (31 Jul 2019 19:09) (98% - 99%)    PHYSICAL EXAM:  GENERAL: NAD, well-developed  HEAD:  Atraumatic, Normocephalic  EYES: EOMI, PERRLA, conjunctiva and sclera clear  NECK: Supple, No JVD  CHEST/LUNG: Clear to auscultation bilaterally; No wheeze  HEART: Regular rate and rhythm; No murmurs, rubs, or gallops  ABDOMEN: Soft, Nontender, Nondistended; Bowel sounds present  EXTREMITIES:   Left Leg - palpable pulses in left foot, warmth to touch  Right leg - palpable popliteal pulse, but unable to palpation DP/TP; right foot seems slightly more pale than left  PSYCH: AAOx3  NEUROLOGY: non-focal  SKIN: No rashes or lesions Vital Signs Last 24 Hrs  T(C): 36.8 (31 Jul 2019 19:09), Max: 36.8 (31 Jul 2019 19:09)  T(F): 98.2 (31 Jul 2019 19:09), Max: 98.2 (31 Jul 2019 19:09)  HR: 60 (31 Jul 2019 19:09) (60 - 72)  BP: 139/84 (31 Jul 2019 19:09) (133/83 - 139/84)  BP(mean): --  RR: 18 (31 Jul 2019 19:09) (17 - 18)  SpO2: 98% (31 Jul 2019 19:09) (98% - 99%)    PHYSICAL EXAM:  GENERAL: NAD, well-developed  HEAD:  Atraumatic, Normocephalic  EYES: EOMI, PERRLA, conjunctiva and sclera clear  NECK: Supple, No JVD  CHEST/LUNG: Clear to auscultation bilaterally; No wheeze  HEART: Regular rate and rhythm; No murmurs, rubs, or gallops  ABDOMEN: Soft, Nontender, Nondistended; Bowel sounds present  EXTREMITIES:   Left Leg - palpable pulses in left foot, warmth to touch  Right leg - palpable popliteal pulse, but unable to palpation DP/TP; right foot seems slightly more pale than left; patient able to move both legs and feels sensation in tact  PSYCH: AAOx3  NEUROLOGY: non-focal  SKIN: No rashes or lesions

## 2019-08-01 NOTE — H&P ADULT - NSICDXPASTMEDICALHX_GEN_ALL_CORE_FT
PAST MEDICAL HISTORY:  Essential hypertension     Malignant neoplasm of left female breast, unspecified estrogen receptor status, unspecified site of breast

## 2019-08-01 NOTE — H&P ADULT - ASSESSMENT
49 yo female with a hx of DCIS s/p lumpectomy (currently on tamoxifen) and HTN who was admitted for an occluded arterial flow in the right SFA and popliteal artery.

## 2019-08-01 NOTE — H&P ADULT - PROBLEM SELECTOR PLAN 1
Vascular Surgery Consulted. Pt with a 4 mo hx of decreased perfusion and pain to the right leg, with UA positive for occluded arterial flow in the right SFA and popliteal  - Vascular Surgery Consulted. Appreciate recs.  - CTA of the A/P with run off of lower extremities  - Heparin gtt  - ABIs  - Will initiate hypercoagulable workup. However, in the setting of an acute, some tests may need to be repeated outpatient. Pt with a 4 mo hx of decreased perfusion and pain to the right leg, with UA positive for occluded arterial flow in the right SFA and popliteal  - Vascular Surgery Consulted. Appreciate recs.  - CTA of the A/P with run off of lower extremities  - Heparin gtt  - ABIs  - Will order a hypercoaguable workup. However, in the setting of an acute, some tests may need to be repeated outpatient.  - Will get HCG, UA, UPC, and HIV to screen for acquired hypercoagulable states. Pt with a 4 mo hx of decreased perfusion and pain to the right leg, with ultrasound positive for occluded arterial flow in the right SFA and popliteal  - Vascular Surgery Consulted. Appreciate recs.  - CTA of the A/P with run off of lower extremities  - Heparin gtt  - ABIs  - Will order a hypercoaguable workup. However, in the setting of an acute, some tests may need to be repeated outpatient.  - Will get HCG, UA, UPC, and HIV to screen for acquired hypercoagulable states. Pt with a 4 mo hx of decreased perfusion and pain to the right leg, with ultrasound positive for occluded arterial flow in the right SFA and popliteal  - Vascular Surgery Consulted. Appreciate recs.  - CTA of the A/P with run off of lower extremities  - Heparin gtt  - ABIs  - Will order a hypercoaguable workup. However, in the setting of an acute clot, some tests may need to be repeated outpatient.  - Will get HCG, UA, UPC, and HIV to screen for acquired hypercoagulable states. Pt with a 4 mo hx of decreased perfusion and pain to the right leg, with ultrasound positive for occluded arterial flow in the right SFA and popliteal. Of note, patient's cancer screenings are up to date and patient on Tamoxifen, which is prothrombic  - Vascular Surgery Consulted. Appreciate recs.  - CTA of the A/P with run off of lower extremities  - Heparin gtt  - ABIs  - Heme/Onc Consulted.  - Will order a hypercoaguable workup. However, in the setting of an acute clot, some tests may need to be repeated outpatient.  - Will get HCG, UA, UPC, and HIV to screen for acquired hypercoagulable states.

## 2019-08-01 NOTE — H&P ADULT - PROBLEM SELECTOR PLAN 4
Diet:  DVT Prophylaxis:   Dispo: Home, pending resolution of symptoms    Bailey Maza  PGY-2 Internal Medicine  Pager: [phone] (NS)/[phone] (OMER) Diet: DASH/TLD  DVT Prophylaxis:  Heparin gtt  Dispo: Home, pending resolution of symptoms    Bailey Maza  PGY-2 Internal Medicine  Pager: [phone] (NS)/[phone] (OMER)

## 2019-08-01 NOTE — PROGRESS NOTE ADULT - SUBJECTIVE AND OBJECTIVE BOX
SURGERY DAILY PROGRESS NOTE: Vascular Surgery       SUBJECTIVE/ROS: Patient seen and evaluated at the bedside. Has complaint of right foot burning and numbness unchanged x 4 months. Denies nausea, vomiting, chest pain, shortness of breath       OBJECTIVE:    Vital Signs Last 24 Hrs  T(C): 36.8 (01 Aug 2019 09:17), Max: 36.9 (01 Aug 2019 05:09)  T(F): 98.2 (01 Aug 2019 09:17), Max: 98.4 (01 Aug 2019 05:09)  HR: 55 (01 Aug 2019 09:17) (52 - 72)  BP: 133/79 (01 Aug 2019 09:17) (125/79 - 149/83)  BP(mean): --  RR: 18 (01 Aug 2019 09:17) (17 - 18)  SpO2: 98% (01 Aug 2019 09:17) (97% - 99%)  I&O's Detail    Daily Height in cm: 165.1 (2019 23:06)    Daily   MEDICATIONS  (STANDING):  amLODIPine   Tablet 5 milliGRAM(s) Oral daily  heparin  Infusion. 1400 Unit(s)/Hr (14 mL/Hr) IV Continuous <Continuous>    MEDICATIONS  (PRN):  heparin  Injectable 7500 Unit(s) IV Push every 6 hours PRN For aPTT less than 40  heparin  Injectable 3500 Unit(s) IV Push every 6 hours PRN For aPTT between 40 - 57      LABS:                        13.6   6.3   )-----------( 372      ( 01 Aug 2019 10:03 )             41.3     07-31    140  |  104  |  9   ----------------------------<  94  4.3   |  24  |  0.82    Ca    9.3      2019 17:32    TPro  6.8  /  Alb  4.1  /  TBili  0.3  /  DBili  x   /  AST  15  /  ALT  19  /  AlkPhos  47  07-31    PT/INR - ( 2019 17:32 )   PT: 10.9 sec;   INR: 0.96 ratio         PTT - ( 01 Aug 2019 01:33 )  PTT:106.4 sec  Urinalysis Basic - ( 01 Aug 2019 03:38 )    Color: Light Yellow / Appearance: Slightly Turbid / S.011 / pH: x  Gluc: x / Ketone: Negative  / Bili: Negative / Urobili: Negative   Blood: x / Protein: Negative / Nitrite: Negative   Leuk Esterase: Large / RBC: 2 /hpf / WBC 12 /HPF   Sq Epi: x / Non Sq Epi: 14 /hpf / Bacteria: Few        PHYSICAL EXAM:  General: NAD, Lying in bed comfortably  Neuro: A+Ox3  HEENT: NC/AT, EOMI  Respiratory: Good effort, no accessory muscle use.  Vascular: palp DP/PT in the left foot, no Right DP/PT signals, Right pop signal  Musculoskeletal: All 4 extremities moving spontaneously, no limitations.  Sensation intact in b/l lower extremities. Right foot cooler to palpation than left foot, right foot appears more white than the left foot, good capillary refill bilaterally

## 2019-08-01 NOTE — H&P ADULT - NSHPREVIEWOFSYSTEMS_GEN_ALL_CORE
REVIEW OF SYSTEMS:    CONSTITUTIONAL: No weakness, fevers or chills  EYES: No visual changes;    ENT: No vertigo or throat pain   NECK: No pain or stiffness  RESPIRATORY: No cough, wheezing, hemoptysis; No shortness of breath  CARDIOVASCULAR: No chest pain or palpitations  GASTROINTESTINAL: No abdominal or epigastric pain. No nausea, vomiting, or hematemesis; No diarrhea or constipation. No melena or hematochezia.  GENITOURINARY: No dysuria, frequency or hematuria  NEUROLOGICAL: No numbness or weakness  EXTREMITY: Cold, numbness, intermittent tingling  SKIN: No itching, rashes

## 2019-08-01 NOTE — PROGRESS NOTE ADULT - ASSESSMENT
47 yo female with a hx of DCIS s/p lumpectomy (currently on tamoxifen) and HTN who was admitted for an occluded arterial flow in the right SFA and popliteal artery currently undergoing hypercoaguble workup with CTA runoff of legs showing large clot in the thigh.

## 2019-08-02 LAB
APTT BLD: 53.4 SEC — HIGH (ref 27.5–36.3)
APTT BLD: 79.9 SEC — HIGH (ref 27.5–36.3)
APTT BLD: 89.8 SEC — HIGH (ref 27.5–36.3)
AT III ACT/NOR PPP CHRO: 88 % — SIGNIFICANT CHANGE UP (ref 85–135)
DRVVT SCREEN TO CONFIRM RATIO: SIGNIFICANT CHANGE UP
HCT VFR BLD CALC: 41.3 % — SIGNIFICANT CHANGE UP (ref 34.5–45)
HGB BLD-MCNC: 13.3 G/DL — SIGNIFICANT CHANGE UP (ref 11.5–15.5)
LA NT DPL PPP QL: 32.7 SEC — SIGNIFICANT CHANGE UP
MCHC RBC-ENTMCNC: 26.5 PG — LOW (ref 27–34)
MCHC RBC-ENTMCNC: 32.3 GM/DL — SIGNIFICANT CHANGE UP (ref 32–36)
MCV RBC AUTO: 82 FL — SIGNIFICANT CHANGE UP (ref 80–100)
PLATELET # BLD AUTO: 369 K/UL — SIGNIFICANT CHANGE UP (ref 150–400)
PROT C ACT/NOR PPP: 107 % — SIGNIFICANT CHANGE UP (ref 74–150)
RBC # BLD: 5.04 M/UL — SIGNIFICANT CHANGE UP (ref 3.8–5.2)
RBC # FLD: 13.2 % — SIGNIFICANT CHANGE UP (ref 10.3–14.5)
WBC # BLD: 6.6 K/UL — SIGNIFICANT CHANGE UP (ref 3.8–10.5)
WBC # FLD AUTO: 6.6 K/UL — SIGNIFICANT CHANGE UP (ref 3.8–10.5)

## 2019-08-02 PROCEDURE — 99233 SBSQ HOSP IP/OBS HIGH 50: CPT | Mod: GC

## 2019-08-02 PROCEDURE — 93926 LOWER EXTREMITY STUDY: CPT | Mod: 26,RT

## 2019-08-02 PROCEDURE — 99223 1ST HOSP IP/OBS HIGH 75: CPT | Mod: GC

## 2019-08-02 RX ADMIN — HEPARIN SODIUM 1600 UNIT(S)/HR: 5000 INJECTION INTRAVENOUS; SUBCUTANEOUS at 23:27

## 2019-08-02 RX ADMIN — HEPARIN SODIUM 1600 UNIT(S)/HR: 5000 INJECTION INTRAVENOUS; SUBCUTANEOUS at 08:44

## 2019-08-02 RX ADMIN — SODIUM CHLORIDE 50 MILLILITER(S): 9 INJECTION, SOLUTION INTRAVENOUS at 14:10

## 2019-08-02 RX ADMIN — HEPARIN SODIUM 1600 UNIT(S)/HR: 5000 INJECTION INTRAVENOUS; SUBCUTANEOUS at 16:53

## 2019-08-02 RX ADMIN — AMLODIPINE BESYLATE 5 MILLIGRAM(S): 2.5 TABLET ORAL at 14:10

## 2019-08-02 RX ADMIN — HEPARIN SODIUM 3500 UNIT(S): 5000 INJECTION INTRAVENOUS; SUBCUTANEOUS at 08:45

## 2019-08-02 NOTE — PROGRESS NOTE ADULT - PROBLEM SELECTOR PLAN 2
Pt recently had an MRI that was negative for cancer in February, taking Tamoxifen 20 mg q Day  - hold Tamoxifen given that is it prothrombotic per oncologist Pt recently had an MRI that was negative for cancer in February, taking Tamoxifen 20 mg q Day  - hold Tamoxifen given that it is prothrombotic per oncologist

## 2019-08-02 NOTE — PROGRESS NOTE ADULT - SUBJECTIVE AND OBJECTIVE BOX
Irwin Chaney MD  Internal Medicine Resident  [phone]    PATIENT:  BRANDON MARTINEZ  [id number]    CHIEF COMPLAINT:  Patient is a 48y old  Female who presents with a chief complaint of Right Leg Pain (01 Aug 2019 16:31)    INTERVAL HISTORY/OVERNIGHT EVENTS:  No issues overnight, leg feels ok with pins and needles today.    REVIEW OF SYSTEMS:  CONSTITUTIONAL: No weakness, fevers or chills  EYES: No visual changes;    ENT: No vertigo or throat pain   NECK: No pain or stiffness  RESPIRATORY: No cough, wheezing, hemoptysis; No shortness of breath  CARDIOVASCULAR: No chest pain or palpitations  GASTROINTESTINAL: No abdominal or epigastric pain. No nausea, vomiting, or hematemesis; No diarrhea or constipation. No melena or hematochezia.  GENITOURINARY: No dysuria, frequency or hematuria  NEUROLOGICAL: No numbness or weakness  EXTREMITY: Cold, numbness, intermittent tingling  SKIN: No itching, rashes    MEDICATIONS:  amLODIPine   Tablet 5 milliGRAM(s) Oral daily  heparin  Infusion. 1400 Unit(s)/Hr (14 mL/Hr) IV Continuous <Continuous>  lactated ringers. 1000 milliLiter(s) (50 mL/Hr) IV Continuous <Continuous>    MEDICATIONS  (PRN):  heparin  Injectable 7500 Unit(s) IV Push every 6 hours PRN For aPTT less than 40  heparin  Injectable 3500 Unit(s) IV Push every 6 hours PRN For aPTT between 40 - 57    ALLERGIES:  Allergies    No Known Allergies    Intolerances        OBJECTIVE:  ICU Vital Signs Last 24 Hrs  T(C): 36.8 (02 Aug 2019 13:54), Max: 36.8 (01 Aug 2019 20:43)  T(F): 98.2 (02 Aug 2019 13:54), Max: 98.3 (01 Aug 2019 20:43)  HR: 54 (02 Aug 2019 13:54) (54 - 60)  BP: 134/84 (02 Aug 2019 13:54) (116/78 - 134/84)  RR: 18 (02 Aug 2019 13:54) (18 - 18)  SpO2: 99% (02 Aug 2019 13:54) (95% - 99%)    I&O's Summary  01 Aug 2019 07:01  -  02 Aug 2019 07:00  --------------------------------------------------------  IN: 1386 mL / OUT: 1 mL / NET: 1385 mL      Daily     Daily     PHYSICAL EXAMINATION:  GENERAL: NAD, well-developed  HEAD:  Atraumatic, Normocephalic  EYES: EOMI, PERRLA, conjunctiva and sclera clear  NECK: Supple, No JVD  CHEST/LUNG: Clear to auscultation bilaterally; No wheeze  HEART: Regular rate and rhythm; No murmurs, rubs, or gallops  ABDOMEN: Soft, Nontender, Nondistended; Bowel sounds present  EXTREMITIES:   Left Leg - palpable pulses in left foot, warmth to touch  Right leg - palpable popliteal pulse, but unable to palpation DP/TP; right foot seems slightly more pale than left; patient able to move both legs and feels sensation in tact  PSYCH: AAOx3  NEUROLOGY: non-focal  SKIN: No rashes or lesions    LABS:                          13.3   6.6   )-----------( 369      ( 02 Aug 2019 06:56 )             41.3     08-    141  |  104  |  7   ----------------------------<  91  4.3   |  23  |  0.80    Ca    9.4      01 Aug 2019 10:03    TPro  6.8  /  Alb  3.8  /  TBili  0.4  /  DBili  x   /  AST  18  /  ALT  19  /  AlkPhos  44  08-01    LIVER FUNCTIONS - ( 01 Aug 2019 10:03 )  Alb: 3.8 g/dL / Pro: 6.8 g/dL / ALK PHOS: 44 U/L / ALT: 19 U/L / AST: 18 U/L / GGT: x           PT/INR - ( 2019 17:32 )   PT: 10.9 sec;   INR: 0.96 ratio    PTT - ( 02 Aug 2019 16:12 )  PTT:89.8 sec    Urinalysis Basic - ( 01 Aug 2019 03:38 )    Color: Light Yellow / Appearance: Slightly Turbid / S.011 / pH: x  Gluc: x / Ketone: Negative  / Bili: Negative / Urobili: Negative   Blood: x / Protein: Negative / Nitrite: Negative   Leuk Esterase: Large / RBC: 2 /hpf / WBC 12 /HPF   Sq Epi: x / Non Sq Epi: 14 /hpf / Bacteria: Few
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
## 2019-08-02 NOTE — CONSULT NOTE ADULT - ASSESSMENT
49 yo female with a hx of DCIS s/p lumpectomy (currently on tamoxifen) and HTN who was admitted for an occluded arterial flow in the right SFA and popliteal artery currently undergoing hypercoagulable workup with CTA runoff of legs showing large clot in the thigh.    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  -Does endorse a positive family hx of possible thrombophilia (reports mother had "clot in her heart in her 40s" and "grandfather  in his 40s from MI"); genetic testing sent, though Factor V Leiden, antithrombin deficiency, and prothrombin gene mutation usually associated with VTE >>> arterial thrombosis. Homocysteine normal level makes MTHFR mutation less likely as explanation for arterial thrombosis.  -Tamoxifen unlikely to cause arterial thromboses, though given acute thrombotic event, would hold for now  -Vasculitis etiology unlikely given ESR normal and CRP only mildly elevated (less specific); also denying any other constitutional symptoms of underlying rheumatic disease  -Agree with cardiac work up to r/o as embolic source  -Agree with heparin gtt for anticoagulation at this time; given young age, would likely benefit from following up with hematology after treatment for repeat of her hypercoagulable work up after being at least 2 weeks off anticoagulation  -Plan discussed with primary team, will continue to follow    Gavino Cuadra MD  Heme/Onc Fellow, PGY4  pager: [phone]

## 2019-08-02 NOTE — PROGRESS NOTE ADULT - PROBLEM SELECTOR PLAN 1
Pt with a 4 mo hx of decreased perfusion and pain to the right leg, with ultrasound positive for occluded arterial flow in the right SFA and popliteal. Of note, patient's cancer screenings are up to date and patient on Tamoxifen for an early stage breast cancer s/p resection, which is prothrombic -> holding tamoxifen per oncologist Dr. Singer  - Vascular Surgery Consulted -> apparently no intervention at this time as they are concerned if she has an underlying prothrombotic state that any intervention with fail. Appreciate recs.  - CTA of the A/P with run off of lower extremities showed a large arterial clot in the right thigh  - will obtain echo to r/o thrombus, EKG wnl.  - Heparin gtt  - Heme/Onc consulted  - Hypercoagulable workup ordered and sent. However, in the setting of an acute clot, some tests may need to be repeated outpatient.  - Will get HCG, UA, UPC, and HIV and test for genetic and acquired hypercoagulable states  - CT abdomen showed renal finding will f/u with MRI belly. Pt with a 4 mo hx of decreased perfusion and pain to the right leg, with ultrasound positive for occluded arterial flow in the right SFA and popliteal. Of note, patient's cancer screenings are up to date and patient on Tamoxifen for an early stage breast cancer s/p resection, which is prothrombic -> holding tamoxifen per oncologist Dr. Singer  - Vascular Surgery Consulted -> apparently no intervention at this time as they are concerned if she has an underlying prothrombotic state that any intervention with fail. Appreciate recs.  - CTA of the A/P with run off of lower extremities showed a large arterial clot in the right thigh  - will obtain echo to r/o thrombus, EKG wnl.  - Heparin gtt  - Heme/Onc consulted  - Hypercoagulable workup ordered and sent. However, in the setting of an acute clot, some tests may need to be repeated outpatient.  - Will get HCG, UA, UPC, and HIV and test for genetic and acquired hypercoagulable states  - CT abdomen showed renal finding will f/u with MRI abdomen

## 2019-08-03 LAB
APTT BLD: 86.9 SEC — HIGH (ref 27.5–36.3)
HCT VFR BLD CALC: 36.6 % — SIGNIFICANT CHANGE UP (ref 34.5–45)
HCT VFR BLD CALC: 37.4 % — SIGNIFICANT CHANGE UP (ref 34.5–45)
HGB BLD-MCNC: 12.6 G/DL — SIGNIFICANT CHANGE UP (ref 11.5–15.5)
HGB BLD-MCNC: 12.7 G/DL — SIGNIFICANT CHANGE UP (ref 11.5–15.5)
MCHC RBC-ENTMCNC: 27.4 PG — SIGNIFICANT CHANGE UP (ref 27–34)
MCHC RBC-ENTMCNC: 28.2 PG — SIGNIFICANT CHANGE UP (ref 27–34)
MCHC RBC-ENTMCNC: 33.6 GM/DL — SIGNIFICANT CHANGE UP (ref 32–36)
MCHC RBC-ENTMCNC: 34.8 GM/DL — SIGNIFICANT CHANGE UP (ref 32–36)
MCV RBC AUTO: 81.2 FL — SIGNIFICANT CHANGE UP (ref 80–100)
MCV RBC AUTO: 81.5 FL — SIGNIFICANT CHANGE UP (ref 80–100)
PLATELET # BLD AUTO: 358 K/UL — SIGNIFICANT CHANGE UP (ref 150–400)
PLATELET # BLD AUTO: 363 K/UL — SIGNIFICANT CHANGE UP (ref 150–400)
RBC # BLD: 4.51 M/UL — SIGNIFICANT CHANGE UP (ref 3.8–5.2)
RBC # BLD: 4.59 M/UL — SIGNIFICANT CHANGE UP (ref 3.8–5.2)
RBC # FLD: 13 % — SIGNIFICANT CHANGE UP (ref 10.3–14.5)
RBC # FLD: 13.1 % — SIGNIFICANT CHANGE UP (ref 10.3–14.5)
WBC # BLD: 6.5 K/UL — SIGNIFICANT CHANGE UP (ref 3.8–10.5)
WBC # BLD: 6.6 K/UL — SIGNIFICANT CHANGE UP (ref 3.8–10.5)
WBC # FLD AUTO: 6.5 K/UL — SIGNIFICANT CHANGE UP (ref 3.8–10.5)
WBC # FLD AUTO: 6.6 K/UL — SIGNIFICANT CHANGE UP (ref 3.8–10.5)

## 2019-08-03 PROCEDURE — 99233 SBSQ HOSP IP/OBS HIGH 50: CPT | Mod: GC

## 2019-08-03 RX ADMIN — HEPARIN SODIUM 1600 UNIT(S)/HR: 5000 INJECTION INTRAVENOUS; SUBCUTANEOUS at 06:51

## 2019-08-03 RX ADMIN — SODIUM CHLORIDE 50 MILLILITER(S): 9 INJECTION, SOLUTION INTRAVENOUS at 06:20

## 2019-08-03 NOTE — PROGRESS NOTE ADULT - PROBLEM SELECTOR PLAN 2
Pt recently had an MRI that was negative for cancer in February, taking Tamoxifen 20 mg q Day  - hold Tamoxifen given that it is prothrombotic per oncologist

## 2019-08-03 NOTE — CHART NOTE - NSCHARTNOTEFT_GEN_A_CORE
patient seen and examined at bedside. no evidence of necrosis. DP palpable bilaterally. patient seen and examined at bedside. no evidence of lower extremity necrosis. DP palpable bilaterally.

## 2019-08-03 NOTE — PROVIDER CONTACT NOTE (OTHER) - REASON
MRI transport on the floor to take patient for MRI abdomen. Pt is on Heparin gtt. I sit ok to stop heparin gtt.

## 2019-08-03 NOTE — PROGRESS NOTE ADULT - SUBJECTIVE AND OBJECTIVE BOX
Irwin Chaney MD  Internal Medicine Resident  [phone]    PATIENT:  BRANDON MARTINEZ  [id number]    CHIEF COMPLAINT:  Patient is a 48y old  Female who presents with a chief complaint of Right Leg Pain (02 Aug 2019 19:25)    INTERVAL HISTORY/OVERNIGHT EVENTS:  No issues overnight, leg feels ok    REVIEW OF SYSTEMS:  CONSTITUTIONAL: No weakness, fevers or chills  EYES: No visual changes;    ENT: No vertigo or throat pain   NECK: No pain or stiffness  RESPIRATORY: No cough, wheezing, hemoptysis; No shortness of breath  CARDIOVASCULAR: No chest pain or palpitations  GASTROINTESTINAL: No abdominal or epigastric pain. No nausea, vomiting, or hematemesis; No diarrhea or constipation. No melena or hematochezia.  GENITOURINARY: No dysuria, frequency or hematuria  NEUROLOGICAL: No numbness or weakness  EXTREMITY: Cold, numbness, intermittent tingling  SKIN: No itching, rashes    MEDICATIONS:  amLODIPine   Tablet 5 milliGRAM(s) Oral daily  heparin  Infusion. 1400 Unit(s)/Hr (14 mL/Hr) IV Continuous <Continuous>    MEDICATIONS  (PRN):  heparin  Injectable 7500 Unit(s) IV Push every 6 hours PRN For aPTT less than 40  heparin  Injectable 3500 Unit(s) IV Push every 6 hours PRN For aPTT between 40 - 57    ALLERGIES:  No Known Allergies    OBJECTIVE:  ICU Vital Signs Last 24 Hrs  T(C): 36.5 (03 Aug 2019 14:34), Max: 36.8 (02 Aug 2019 20:54)  T(F): 97.7 (03 Aug 2019 14:34), Max: 98.3 (02 Aug 2019 20:54)  HR: 61 (03 Aug 2019 14:34) (56 - 68)  BP: 128/78 (03 Aug 2019 14:34) (114/71 - 144/80)  RR: 18 (03 Aug 2019 14:34) (18 - 18)  SpO2: 99% (03 Aug 2019 14:34) (99% - 99%)    I&O's Summary    02 Aug 2019 07:01  -  03 Aug 2019 07:00  --------------------------------------------------------  IN: 808 mL / OUT: 0 mL / NET: 808 mL      Daily     Daily     PHYSICAL EXAMINATION:  GENERAL: NAD, well-developed  HEAD:  Atraumatic, Normocephalic  EYES: EOMI, PERRLA, conjunctiva and sclera clear  NECK: Supple, No JVD  CHEST/LUNG: Clear to auscultation bilaterally; No wheeze  HEART: Regular rate and rhythm; No murmurs, rubs, or gallops  ABDOMEN: Soft, Nontender, Nondistended; Bowel sounds present  EXTREMITIES:   Left Leg - palpable pulses in left foot, warmth to touch  Right leg - palpable popliteal pulse, DP on right slightly palpable; right foot seems slightly more pale than left; patient able to move both legs and feels sensation in tact  PSYCH: AAOx3  NEUROLOGY: non-focal  SKIN: No rashes or lesions    LABS:                        12.7   6.5   )-----------( 358      ( 03 Aug 2019 06:28 )             36.6             PTT - ( 03 Aug 2019 06:28 )  PTT:86.9 sec            TELEMETRY:     EKG:     IMAGING: Irwin Chaney MD  Internal Medicine Resident  [phone]    PATIENT:  BRANDON MARTINEZ  [id number]    CHIEF COMPLAINT:  Patient is a 48y old  Female who presents with a chief complaint of Right Leg Pain (02 Aug 2019 19:25)    INTERVAL HISTORY/OVERNIGHT EVENTS:  No issues overnight, leg feels ok    REVIEW OF SYSTEMS:  CONSTITUTIONAL: No weakness, fevers or chills  EYES: No visual changes;    ENT: No vertigo or throat pain   NECK: No pain or stiffness  RESPIRATORY: No cough, wheezing, hemoptysis; No shortness of breath  CARDIOVASCULAR: No chest pain or palpitations  GASTROINTESTINAL: No abdominal or epigastric pain. No nausea, vomiting, or hematemesis; No diarrhea or constipation. No melena or hematochezia.  GENITOURINARY: No dysuria, frequency or hematuria  NEUROLOGICAL: No numbness or weakness  EXTREMITY: Cold, numbness, intermittent tingling  SKIN: No itching, rashes    MEDICATIONS:  amLODIPine   Tablet 5 milliGRAM(s) Oral daily  heparin  Infusion. 1400 Unit(s)/Hr (14 mL/Hr) IV Continuous <Continuous>    MEDICATIONS  (PRN):  heparin  Injectable 7500 Unit(s) IV Push every 6 hours PRN For aPTT less than 40  heparin  Injectable 3500 Unit(s) IV Push every 6 hours PRN For aPTT between 40 - 57    ALLERGIES:  No Known Allergies    OBJECTIVE:  ICU Vital Signs Last 24 Hrs  T(C): 36.5 (03 Aug 2019 14:34), Max: 36.8 (02 Aug 2019 20:54)  T(F): 97.7 (03 Aug 2019 14:34), Max: 98.3 (02 Aug 2019 20:54)  HR: 61 (03 Aug 2019 14:34) (56 - 68)  BP: 128/78 (03 Aug 2019 14:34) (114/71 - 144/80)  RR: 18 (03 Aug 2019 14:34) (18 - 18)  SpO2: 99% (03 Aug 2019 14:34) (99% - 99%)    I&O's Summary    02 Aug 2019 07:01  -  03 Aug 2019 07:00  --------------------------------------------------------  IN: 808 mL / OUT: 0 mL / NET: 808 mL      Daily     Daily     PHYSICAL EXAMINATION:  GENERAL: NAD, well-developed  HEAD:  Atraumatic, Normocephalic  EYES: EOMI, PERRLA, conjunctiva and sclera clear  NECK: Supple, No JVD  CHEST/LUNG: Clear to auscultation bilaterally; No wheeze  HEART: Regular rate and rhythm; No murmurs, rubs, or gallops  ABDOMEN: Soft, Nontender, Nondistended; Bowel sounds present  EXTREMITIES:   Left Leg - palpable pulses in left foot, warmth to touch  Right leg - palpable popliteal pulse, DP on right slightly palpable; right foot seems slightly more pale than left; patient able to move both legs and feels sensation in tact  PSYCH: AAOx3  NEUROLOGY: non-focal  SKIN: No rashes or lesions    LABS:                        12.7   6.5   )-----------( 358      ( 03 Aug 2019 06:28 )             36.6     PTT - ( 03 Aug 2019 06:28 )  PTT:86.9 sec

## 2019-08-03 NOTE — PROGRESS NOTE ADULT - PROBLEM SELECTOR PLAN 1
Pt with a 4 mo hx of decreased perfusion and pain to the right leg, with ultrasound positive for occluded arterial flow in the right SFA and popliteal. Of note, patient's cancer screenings are up to date and patient on Tamoxifen for an early stage breast cancer s/p resection, which is prothrombic -> holding tamoxifen per oncologist Dr. Singer  - Vascular Surgery Consulted -> apparently no intervention at this time as they are concerned if she has an underlying prothrombotic state that any intervention with fail. Appreciate recs.  - CTA of the A/P with run off of lower extremities showed a large arterial clot in the right thigh  - will obtain echo to r/o thrombus, EKG wnl.  - Heparin gtt  - Heme/Onc consulted  - Hypercoagulable workup ordered and sent. However, in the setting of an acute clot, some tests may need to be repeated outpatient.  - Will get HCG, UA, UPC, and HIV and test for genetic and acquired hypercoagulable states -> HIV(-), lupus anticoagulant(-), ESR/CRP mildly elevated but not suggestive of vasculitis, prothrombin gene pending  - CT abdomen showed renal finding will f/u with MRI abdomen

## 2019-08-03 NOTE — PROGRESS NOTE ADULT - ASSESSMENT
49 yo female with a hx of DCIS s/p lumpectomy (currently on tamoxifen) and HTN who was admitted for an occluded arterial flow in the right SFA and popliteal artery currently undergoing hypercoaguble workup with CTA runoff of legs showing large clot in the thigh.

## 2019-08-04 DIAGNOSIS — N28.89 OTHER SPECIFIED DISORDERS OF KIDNEY AND URETER: ICD-10-CM

## 2019-08-04 LAB
APTT BLD: 92.4 SEC — HIGH (ref 27.5–36.3)
B2 GLYCOPROT1 AB SER QL: NEGATIVE — SIGNIFICANT CHANGE UP
HCT VFR BLD CALC: 38.4 % — SIGNIFICANT CHANGE UP (ref 34.5–45)
HGB BLD-MCNC: 12.4 G/DL — SIGNIFICANT CHANGE UP (ref 11.5–15.5)
MCHC RBC-ENTMCNC: 26.8 PG — LOW (ref 27–34)
MCHC RBC-ENTMCNC: 32.4 GM/DL — SIGNIFICANT CHANGE UP (ref 32–36)
MCV RBC AUTO: 82.6 FL — SIGNIFICANT CHANGE UP (ref 80–100)
PLATELET # BLD AUTO: 366 K/UL — SIGNIFICANT CHANGE UP (ref 150–400)
RBC # BLD: 4.65 M/UL — SIGNIFICANT CHANGE UP (ref 3.8–5.2)
RBC # FLD: 13.2 % — SIGNIFICANT CHANGE UP (ref 10.3–14.5)
WBC # BLD: 7.3 K/UL — SIGNIFICANT CHANGE UP (ref 3.8–10.5)
WBC # FLD AUTO: 7.3 K/UL — SIGNIFICANT CHANGE UP (ref 3.8–10.5)

## 2019-08-04 PROCEDURE — 99233 SBSQ HOSP IP/OBS HIGH 50: CPT | Mod: GC

## 2019-08-04 PROCEDURE — 74183 MRI ABD W/O CNTR FLWD CNTR: CPT | Mod: 26

## 2019-08-04 RX ADMIN — AMLODIPINE BESYLATE 5 MILLIGRAM(S): 2.5 TABLET ORAL at 09:34

## 2019-08-04 RX ADMIN — HEPARIN SODIUM 1600 UNIT(S)/HR: 5000 INJECTION INTRAVENOUS; SUBCUTANEOUS at 12:03

## 2019-08-04 NOTE — CONSULT NOTE ADULT - ASSESSMENT
R renal mass 48F with hx of DCIS s/p lumpectomy 9/28 presenting with extensive arterial occlusion in the right leg. Incidental finding of 1.7 right lower pole renal mass. MRI with characteristics concerning for RCC    - No acute urologic intervention while inpatient  - F/u hematology workup regarding hypercoagulable state  - Discussed with patient that this mass is most likely malignant and will need followup once discharged  - Please call the [facility] a [phone] after discharge to schedule an appointment

## 2019-08-04 NOTE — PROGRESS NOTE ADULT - PROBLEM SELECTOR PLAN 2
Pt recently had an MRI that was negative for cancer in February, taking Tamoxifen 20 mg q Day  - hold Tamoxifen given that it is prothrombotic per oncologist - The patient was found to have a right renal mass on CT angio. MRI of the abdomen shows 1.7 cm solid mass in right lower pole RCC until proven otherwise.   - Urology was consulted and recommended likely outpatient follow up for excision.   - f/u final recs

## 2019-08-04 NOTE — PROGRESS NOTE ADULT - PROBLEM SELECTOR PLAN 1
Pt with a 4 mo hx of decreased perfusion and pain to the right leg, with ultrasound positive for occluded arterial flow in the right SFA and popliteal. Of note, patient's cancer screenings are up to date and patient on Tamoxifen for an early stage breast cancer s/p resection, which is prothrombic -> holding tamoxifen per oncologist Dr. Singer  - Vascular Surgery Consulted -> apparently no intervention at this time as they are concerned if she has an underlying prothrombotic state that any intervention with fail. Appreciate recs.  - CTA of the A/P with run off of lower extremities showed a large arterial clot in the right thigh  - will obtain echo to r/o thrombus, EKG wnl.  - Heparin gtt  - Heme/Onc consulted  - Hypercoagulable workup ordered and sent. However, in the setting of an acute clot, some tests may need to be repeated outpatient.  - Will get HCG, UA, UPC, and HIV and test for genetic and acquired hypercoagulable states -> HIV(-), lupus anticoagulant(-), ESR/CRP mildly elevated but not suggestive of vasculitis, prothrombin gene pending  - CT abdomen showed renal finding will f/u with MRI abdomen (can hold heparin drip for MRI.

## 2019-08-04 NOTE — PROGRESS NOTE ADULT - SUBJECTIVE AND OBJECTIVE BOX
Patient is a 48y old  Female who presents with a chief complaint of Right Leg Pain (03 Aug 2019 16:38)      Nacho Alexander, PGY2  Pager: [phone]    SUBJECTIVE / OVERNIGHT EVENTS:  Patient seen and examined at bedside. No acute events overnight. the patient's leg pain is stable. She has been able to walk around a little bit. She has been tolerating her diet, and has regular bowel movements.     REVIEW OF SYSTEMS:    CONSTITUTIONAL: No weakness, fevers or chills  EYES/ENT: No visual changes;  No vertigo or throat pain   NECK: No pain or stiffness  RESPIRATORY: No cough, wheezing, hemoptysis; No shortness of breath  CARDIOVASCULAR: No chest pain or palpitations  GASTROINTESTINAL: No abdominal or epigastric pain. No nausea, vomiting, or hematemesis; No diarrhea or constipation. No melena or hematochezia.  GENITOURINARY: No dysuria, frequency or hematuria  NEUROLOGICAL: No numbness or weakness  SKIN: No itching, rashes      MEDICATIONS  (STANDING):  amLODIPine   Tablet 5 milliGRAM(s) Oral daily  heparin  Infusion. 1400 Unit(s)/Hr (14 mL/Hr) IV Continuous <Continuous>    MEDICATIONS  (PRN):  heparin  Injectable 7500 Unit(s) IV Push every 6 hours PRN For aPTT less than 40  heparin  Injectable 3500 Unit(s) IV Push every 6 hours PRN For aPTT between 40 - 57      OBJECTIVE:    Vital Signs Last 24 Hrs  T(C): 36.8 (04 Aug 2019 04:00), Max: 36.9 (03 Aug 2019 21:13)  T(F): 98.3 (04 Aug 2019 04:00), Max: 98.5 (03 Aug 2019 21:13)  HR: 62 (04 Aug 2019 09:32) (52 - 62)  BP: 152/93 (04 Aug 2019 09:32) (106/67 - 152/93)  BP(mean): --  RR: 18 (04 Aug 2019 04:00) (18 - 18)  SpO2: 97% (04 Aug 2019 04:00) (97% - 99%)    I&O's Summary    03 Aug 2019 07:01  -  04 Aug 2019 07:00  --------------------------------------------------------  IN: 314 mL / OUT: 0 mL / NET: 314 mL        PHYSICAL EXAM:  GENERAL: NAD, well-developed  HEAD:  Atraumatic, Normocephalic  CHEST/LUNG: Clear to auscultation bilaterally; No wheeze  HEART: Regular rate and rhythm; No murmurs, rubs, or gallops  ABDOMEN: Soft, Nontender, Nondistended; Bowel sounds present  EXTREMITIES: RLE warm, mildly cyanotic, no pulse palpable. LLE normal.  no edema  PSYCH: AAOx3  NEUROLOGY: non-focal  SKIN: No rashes or lesions    LABS:                        12.4   7.3   )-----------( 366      ( 04 Aug 2019 06:27 )             38.4     Auto Eosinophil # x     / Auto Eosinophil % x     / Auto Neutrophil # x     / Auto Neutrophil % x     / BANDS % x                            12.7   6.5   )-----------( 358      ( 03 Aug 2019 06:28 )             36.6     Auto Eosinophil # x     / Auto Eosinophil % x     / Auto Neutrophil # x     / Auto Neutrophil % x     / BANDS % x        PTT - ( 04 Aug 2019 06:27 )  PTT:92.4 sec Patient is a 48y old  Female who presents with a chief complaint of Right Leg Pain (03 Aug 2019 16:38)      Nacho Alexander, PGY2  Pager: [phone]    SUBJECTIVE / OVERNIGHT EVENTS:  Patient seen and examined at bedside. No acute events overnight. the patient's leg pain is stable. She has been able to walk around a little bit. She has been tolerating her diet, and has regular bowel movements.     REVIEW OF SYSTEMS:    CONSTITUTIONAL: No weakness, fevers or chills  EYES/ENT: No visual changes;  No vertigo or throat pain   NECK: No pain or stiffness  RESPIRATORY: No cough, wheezing, hemoptysis; No shortness of breath  CARDIOVASCULAR: No chest pain or palpitations  GASTROINTESTINAL: No abdominal or epigastric pain. No nausea, vomiting, or hematemesis; No diarrhea or constipation. No melena or hematochezia.  GENITOURINARY: No dysuria, frequency or hematuria  NEUROLOGICAL: No numbness or weakness  SKIN: No itching, rashes      MEDICATIONS  (STANDING):  amLODIPine   Tablet 5 milliGRAM(s) Oral daily  heparin  Infusion. 1400 Unit(s)/Hr (14 mL/Hr) IV Continuous <Continuous>    MEDICATIONS  (PRN):  heparin  Injectable 7500 Unit(s) IV Push every 6 hours PRN For aPTT less than 40  heparin  Injectable 3500 Unit(s) IV Push every 6 hours PRN For aPTT between 40 - 57      OBJECTIVE:    Vital Signs Last 24 Hrs  T(C): 36.8 (04 Aug 2019 04:00), Max: 36.9 (03 Aug 2019 21:13)  T(F): 98.3 (04 Aug 2019 04:00), Max: 98.5 (03 Aug 2019 21:13)  HR: 62 (04 Aug 2019 09:32) (52 - 62)  BP: 152/93 (04 Aug 2019 09:32) (106/67 - 152/93)  BP(mean): --  RR: 18 (04 Aug 2019 04:00) (18 - 18)  SpO2: 97% (04 Aug 2019 04:00) (97% - 99%)    I&O's Summary    03 Aug 2019 07:01  -  04 Aug 2019 07:00  --------------------------------------------------------  IN: 314 mL / OUT: 0 mL / NET: 314 mL        PHYSICAL EXAM:  GENERAL: NAD, well-developed  HEAD:  Atraumatic, Normocephalic  CHEST/LUNG: Clear to auscultation bilaterally; No wheeze  HEART: Regular rate and rhythm; No murmurs, rubs, or gallops  ABDOMEN: Soft, Nontender, Nondistended; Bowel sounds present  EXTREMITIES: RLE warm, mildly cyanotic, no pulse palpable. LLE normal.  no edema  PSYCH: AAOx3  NEUROLOGY: non-focal  SKIN: No rashes or lesions    LABS:                        12.4   7.3   )-----------( 366      ( 04 Aug 2019 06:27 )             38.4     Auto Eosinophil # x     / Auto Eosinophil % x     / Auto Neutrophil # x     / Auto Neutrophil % x     / BANDS % x                            12.7   6.5   )-----------( 358      ( 03 Aug 2019 06:28 )             36.6     Auto Eosinophil # x     / Auto Eosinophil % x     / Auto Neutrophil # x     / Auto Neutrophil % x     / BANDS % x        PTT - ( 04 Aug 2019 06:27 )  PTT:92.4 sec    Care discussed with urology    Radiology reviewed  < from: MR Abdomen w/wo IV Cont (08.04.19 @ 12:02) >  LOWER CHEST: Within normal limits.    LIVER: Normal morphology. Heterogeneous hepatic steatosis. Small   calcified granuloma right hepatic lobe. No focal hepatic mass.  BILE DUCTS: Normal caliber.  GALLBLADDER: Cholecystectomy.  SPLEEN: Within normal limits.  PANCREAS: Within normal limits.  ADRENALS: Within normal limits.  KIDNEYS/URETERS: A 1.7 cm solid, enhancing partially exophytic posterior   right lower pole renal mass. No evidence of internal bulk fat. Findings   are consistent with renal cell carcinoma until proven otherwise.    VISUALIZED PORTIONS:    BOWEL: Within normal limits.   PERITONEUM: No ascites.  VESSELS: Renal veinsand IVC are patent.  RETROPERITONEUM/LYMPH NODES: No lymphadenopathy.    ABDOMINAL WALL: Within normal limits.  BONES: Within normal limits.    IMPRESSION:     A 1.7 cm solid enhancing posterior right lower pole renal mass, renal   cell carcinoma until proven otherwise.    Heterogeneous hepatic steatosis.    < end of copied text >

## 2019-08-04 NOTE — PROGRESS NOTE ADULT - PROBLEM SELECTOR PLAN 4
Diet: DASH/TLD  DVT Prophylaxis:  Heparin gtt  Disposition: requires inpatient level of care. Continue Amlodipine 5 mg at home dose

## 2019-08-04 NOTE — PROGRESS NOTE ADULT - PROBLEM SELECTOR PROBLEM 2
Malignant neoplasm of left female breast, unspecified estrogen receptor status, unspecified site of breast Renal mass, right
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
## 2019-08-04 NOTE — PROGRESS NOTE ADULT - ATTENDING COMMENTS
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 awaiting vascular decision as to any possible surgical intervention given the extent of thrombosis.   Renal mass- incidental finding. will require outpt f/u with Urology for excision/biopsy.

## 2019-08-04 NOTE — CONSULT NOTE ADULT - ATTENDING COMMENTS
Full consult note as above; discussed with surgery resident and vascular fellow.   She has disabling claudication in her right leg for four months. She recently developed pain and numbness in her foot. She had a duplex scan done that noted arterial occlusions and was sent to the ER. She has normal pulses in her right leg. On the right side the femoral pulse was palpable but there were no popliteal or pedal pulses. She did not have an acutely ischemic right leg but has significant ischemia there. A CT angiogram noted thromboembolic occlusion on the mid and distal profunda femoris artery and also of the distal superficial femoral and popliteal arteries. The etiology of this thromboembolic event is unclear. She also had a lesion in her right kidney that may be neoplastic. She will get a hypercoagulable workup and be evaluated by hematology and urology. If a significant hypercoagulable issue is identified we will have to discuss the management of this and the risk of re-occlusion of the arteries with a revascularization procedure. As her symptoms have been present for 4 months she may not be amenable to either a thrombolysis or a thrombectomy. It is possible that she will require a right leg bypass. The revascularization plan will be determined once the hematology and urology consultants have been obtained.
49 yo female with a hx of DCIS s/p lumpectomy (currently on tamoxifen) and HTN who was admitted for an occluded arterial flow in the right SFA and popliteal artery currently undergoing hypercoagulable workup with CTA runoff of legs showing large clot in the thigh.   Hematology consulted to help with evaluation of possible hypercoagulable state. She does report positive FH in her mother and grandfather.  Workup negative so far but still await APA antibodies and Prothrombin gene mutation  Vasculitis etiology unlikely given ESR normal and CRP only mildly elevated (less specific)  Tamoxifen is not commonly associated with arterial thromboses, though given acute thrombotic event, would hold for now as perhaps it has increased an underlying risk factor.  Continue anticoagulation with heparin and we will follow with you.
Imaging reviewed.  No urologic intervention at this time.  Recommend outpatient follow up for further evaluation.

## 2019-08-05 ENCOUNTER — TRANSCRIPTION ENCOUNTER (OUTPATIENT)
Age: 49
End: 2019-08-05

## 2019-08-05 LAB
ANION GAP SERPL CALC-SCNC: 15 MMOL/L — SIGNIFICANT CHANGE UP (ref 5–17)
APTT BLD: 74.6 SEC — HIGH (ref 27.5–36.3)
BUN SERPL-MCNC: 8 MG/DL — SIGNIFICANT CHANGE UP (ref 7–23)
CALCIUM SERPL-MCNC: 9.6 MG/DL — SIGNIFICANT CHANGE UP (ref 8.4–10.5)
CARDIOLIPIN AB SER-ACNC: NEGATIVE — SIGNIFICANT CHANGE UP
CHLORIDE SERPL-SCNC: 106 MMOL/L — SIGNIFICANT CHANGE UP (ref 96–108)
CO2 SERPL-SCNC: 18 MMOL/L — LOW (ref 22–31)
CREAT SERPL-MCNC: 0.78 MG/DL — SIGNIFICANT CHANGE UP (ref 0.5–1.3)
GLUCOSE SERPL-MCNC: 99 MG/DL — SIGNIFICANT CHANGE UP (ref 70–99)
HCT VFR BLD CALC: 38.6 % — SIGNIFICANT CHANGE UP (ref 34.5–45)
HGB BLD-MCNC: 13.2 G/DL — SIGNIFICANT CHANGE UP (ref 11.5–15.5)
INR BLD: 1.01 RATIO — SIGNIFICANT CHANGE UP (ref 0.88–1.16)
MAGNESIUM SERPL-MCNC: 2.1 MG/DL — SIGNIFICANT CHANGE UP (ref 1.6–2.6)
MCHC RBC-ENTMCNC: 28.1 PG — SIGNIFICANT CHANGE UP (ref 27–34)
MCHC RBC-ENTMCNC: 34.2 GM/DL — SIGNIFICANT CHANGE UP (ref 32–36)
MCV RBC AUTO: 82 FL — SIGNIFICANT CHANGE UP (ref 80–100)
PHOSPHATE SERPL-MCNC: 3.8 MG/DL — SIGNIFICANT CHANGE UP (ref 2.5–4.5)
PLATELET # BLD AUTO: 382 K/UL — SIGNIFICANT CHANGE UP (ref 150–400)
POTASSIUM SERPL-MCNC: 4.1 MMOL/L — SIGNIFICANT CHANGE UP (ref 3.5–5.3)
POTASSIUM SERPL-SCNC: 4.1 MMOL/L — SIGNIFICANT CHANGE UP (ref 3.5–5.3)
PROTHROM AB SERPL-ACNC: 11.6 SEC — SIGNIFICANT CHANGE UP (ref 10–12.9)
RBC # BLD: 4.71 M/UL — SIGNIFICANT CHANGE UP (ref 3.8–5.2)
RBC # FLD: 13.1 % — SIGNIFICANT CHANGE UP (ref 10.3–14.5)
SODIUM SERPL-SCNC: 139 MMOL/L — SIGNIFICANT CHANGE UP (ref 135–145)
WBC # BLD: 8 K/UL — SIGNIFICANT CHANGE UP (ref 3.8–10.5)
WBC # FLD AUTO: 8 K/UL — SIGNIFICANT CHANGE UP (ref 3.8–10.5)

## 2019-08-05 PROCEDURE — 93306 TTE W/DOPPLER COMPLETE: CPT | Mod: 26

## 2019-08-05 PROCEDURE — 99233 SBSQ HOSP IP/OBS HIGH 50: CPT

## 2019-08-05 RX ADMIN — HEPARIN SODIUM 1600 UNIT(S)/HR: 5000 INJECTION INTRAVENOUS; SUBCUTANEOUS at 08:09

## 2019-08-05 NOTE — PROGRESS NOTE ADULT - PROBLEM SELECTOR PLAN 2
- The patient was found to have a right renal mass on CT angio. MRI of the abdomen shows 1.7 cm solid mass in right lower pole RCC until proven otherwise.   - Urology was consulted and recommended likely outpatient follow up for excision.   - f/u final recs

## 2019-08-05 NOTE — PROGRESS NOTE ADULT - SUBJECTIVE AND OBJECTIVE BOX
VASCULAR SURGERY PROGRESS NOTE    S: Patient doing well, no new complaints.    O: Vital Signs  T(C): 36.8 (08-05 @ 04:45), Max: 37.3 (08-04 @ 14:35)  HR: 55 (08-05 @ 04:45) (55 - 70)  BP: 111/67 (08-05 @ 04:45) (111/67 - 152/93)  RR: 18 (08-05 @ 04:45) (18 - 18)  SpO2: 95% (08-05 @ 04:45) (95% - 100%)    General: alert and oriented, NAD  Resp: airway patent, respirations unlabored  CVS: regular rate and rhythm  Extremities: no edema, warm well perfused, L palpable PT, R no DP/PT signals                          12.4   7.3   )-----------( 366      ( 04 Aug 2019 06:27 )             38.4

## 2019-08-05 NOTE — DISCHARGE NOTE PROVIDER - CARE PROVIDER_API CALL
Anna Bunch)  Medical Oncology  [address] [place] [address]  Phone: [phone]  Fax: [phone]  Follow Up Time: Anna Bunch)  Medical Oncology  [address] [place] [address]  Phone: [phone]  Fax: [phone]  Follow Up Time:     Jonny Gonzalez)  Urology  [address] [place] [address], [place] [address]  [place] [address]  Phone: [phone]  Fax: [phone]  Follow Up Time: Anna Bunch)  Medical Oncology  [address] [place] [address]  Phone: [phone]  Fax: [phone]  Follow Up Time:     Jonny Gonzalez)  Urology  [address] [facility], [address]  [place] [address]  Phone: [phone]  Fax: [phone]  Follow Up Time:     Tolu Fontenot)  Surgery; Vascular Surgery  [address] [facility], [address]  [place] [address]  Phone: [phone]  Fax: [phone]  Follow Up Time: Anna Bunch)  Medical Oncology  [address] [place] [address]  Phone: [phone]  Fax: [phone]  Follow Up Time:     Jonny Gonzalez)  Urology  [address] [facility], [address]  [place] [address]  Phone: [phone]  Fax: [phone]  Follow Up Time:     Tolu Fontenot)  Surgery; Vascular Surgery  [address] [facility], [address]  [place] [address]  Phone: [phone]  Fax: [phone]  Follow Up Time:     Norm Blackmon)  Internal Medicine; Medical Oncology  [address] [place] [address]  Phone: [phone]  Fax: [phone]  Follow Up Time: Anna Bunch)  Medical Oncology  [address] [place] [address]  Phone: [phone]  Fax: [phone]  Follow Up Time: 2 weeks    Jonny Gonzalez)  Urology  [address] [facility], [address]  [place] [address]  Phone: [phone]  Fax: [phone]  Follow Up Time: 1 week    Tolu Fontenot)  Surgery; Vascular Surgery  [address] [facility], [address]  [place] [address]  Phone: [phone]  Fax: [phone]  Follow Up Time: 1-3 days    Norm Blackmon)  Internal Medicine; Medical Oncology  [address] [place] [address]  Phone: [phone]  Fax: [phone]  Follow Up Time: 1-3 days Anna Bunch)  Medical Oncology  [address] [place] [address]  Phone: [phone]  Fax: [phone]  Follow Up Time: 2 weeks    Jonny Gonzalez)  Urology  [address] [facility], [address]  [place] [address]  Phone: [phone]  Fax: [phone]  Follow Up Time: 1 week    Tolu Fontenot)  Surgery; Vascular Surgery  [address] [facility], [address]  [place] [address]  Phone: [phone]  Fax: [phone]  Follow Up Time: 1-3 days    Norm Blackmon)  Internal Medicine; Medical Oncology  [address] [place] [address]  Phone: [phone]  Fax: [phone]  Follow Up Time: 1-3 days

## 2019-08-05 NOTE — PROGRESS NOTE ADULT - PROBLEM SELECTOR PLAN 1
Pt with a 4 mo hx of decreased perfusion and pain to the right leg, with ultrasound positive for occluded arterial flow in the right SFA and popliteal. Of note, patient's cancer screenings are up to date and patient on Tamoxifen for an early stage breast cancer s/p resection, which is prothrombic -> holding tamoxifen per oncologist Dr. Singer  - Vascular Surgery Consulted -> apparently no intervention at this time as they are concerned if she has an underlying prothrombotic state that any intervention with fail. Appreciate recs, will continue to follow  - CTA of the A/P with run off of lower extremities showed a large arterial clot in the right thigh  - will obtain echo to r/o thrombus, EKG wnl.  - Heparin gtt  - Heme/Onc consulted  - Hypercoagulable workup ordered and sent. However, in the setting of an acute clot, some tests may need to be repeated outpatient.  - Will get HCG, UA, UPC, and HIV and test for genetic and acquired hypercoagulable states -> HIV(-), lupus anticoagulant(-), ESR/CRP mildly elevated but not suggestive of vasculitis, prothrombin gene pending  - CT abdomen showed renal finding concerning for RCC Pt with a 4 mo hx of decreased perfusion and pain to the right leg, with ultrasound positive for occluded arterial flow in the right SFA and popliteal. Of note, patient's cancer screenings are up to date and patient on Tamoxifen for an early stage breast cancer s/p resection, which is prothrombic -> holding tamoxifen per oncologist Dr. Blackmon  - Vascular Surgery Consulted -> apparently no intervention at this time as they are concerned if she has an underlying prothrombotic state that any intervention with fail. Appreciate recs, will continue to follow  - CTA of the A/P with run off of lower extremities showed a large arterial clot in the right thigh  - will obtain echo to r/o thrombus, EKG wnl.  - Heparin gtt  - Heme/Onc consulted  - Hypercoagulable workup ordered and sent. However, in the setting of an acute clot, some tests may need to be repeated outpatient.  - Will get HCG, UA, UPC, and HIV and test for genetic and acquired hypercoagulable states -> HIV(-), lupus anticoagulant(-), ESR/CRP mildly elevated but not suggestive of vasculitis, prothrombin gene pending  - CT abdomen showed renal finding concerning for RCC Pt with a 4 mo hx of decreased perfusion and pain to the right leg, with ultrasound positive for occluded arterial flow in the right SFA and popliteal. Of note, patient's cancer screenings are up to date and patient on Tamoxifen for an early stage breast cancer s/p resection, which is prothrombic -> holding tamoxifen per oncologist Dr. Blackmon  - Vascular Surgery Consulted -> apparently no intervention at this time as they are concerned if she has an underlying prothrombotic state that any intervention with fail. Appreciate recs, will continue to follow  - CTA of the A/P with run off of lower extremities showed a large arterial clot in the right thigh  - will obtain echo to r/o thrombus, EKG wnl.  - Heparin gtt  - Heme/Onc consulted  - Hypercoagulable workup ordered and sent. However, in the setting of an acute clot, some tests may need to be repeated outpatient off of anticoagulation (per heme/onc)  - Will get HCG, UA, UPC, and HIV and test for genetic and acquired hypercoagulable states -> HIV(-), lupus anticoagulant(-), ESR/CRP mildly elevated but not suggestive of vasculitis, prothrombin gene pending  - CT abdomen showed renal finding concerning for RCC Pt with a 4 mo hx of decreased perfusion and pain to the right leg, with ultrasound positive for occluded arterial flow in the right SFA and popliteal. Of note, patient's cancer screenings are up to date and patient on Tamoxifen for an early stage breast cancer s/p resection, which is prothrombic -> holding tamoxifen per oncologist Dr. Blackmon  - Vascular Surgery Consulted -> apparently no intervention at this time as they are concerned if she has an underlying prothrombotic state that any intervention with fail. Appreciate recs, will continue to follow  - CTA of the A/P with run off of lower extremities showed a large arterial clot in the right thigh  - will obtain echo to r/o thrombus, EKG wnl.  - Heparin gtt- will f/u with vascular if we can transition to eliquis or lovenox  - Heme/Onc consulted  - Hypercoagulable workup ordered and sent. However, in the setting of an acute clot, some tests may need to be repeated outpatient off of anticoagulation (per heme/onc)  - Will get HCG, UA, UPC, and HIV and test for genetic and acquired hypercoagulable states -> HIV(-), lupus anticoagulant(-), ESR/CRP mildly elevated but not suggestive of vasculitis, prothrombin gene pending  - CT abdomen showed renal finding concerning for RCC

## 2019-08-05 NOTE — DISCHARGE NOTE PROVIDER - PROVIDER TOKENS
PROVIDER:[TOKEN:[[id number]]] PROVIDER:[TOKEN:[[id number]]],PROVIDER:[TOKEN:[[id number]]] PROVIDER:[TOKEN:[[id number]]],PROVIDER:[TOKEN:[[id number]]],PROVIDER:[TOKEN:[[id number]]] PROVIDER:[TOKEN:[[id number]]],PROVIDER:[TOKEN:[[id number]]],PROVIDER:[TOKEN:[[id number]]],PROVIDER:[TOKEN:[[id number]]] PROVIDER:[TOKEN:[[id number]],FOLLOWUP:[2 weeks]],PROVIDER:[TOKEN:[[id number]],FOLLOWUP:[1 week]],PROVIDER:[TOKEN:[[id number]],FOLLOWUP:[1-3 days]],PROVIDER:[TOKEN:[[id number]],FOLLOWUP:[1-3 days]]

## 2019-08-05 NOTE — DISCHARGE NOTE PROVIDER - NSDCCPCAREPLAN_GEN_ALL_CORE_FT
PRINCIPAL DISCHARGE DIAGNOSIS  Diagnosis: Arterial occlusion  Assessment and Plan of Treatment: this is the cause of your leg pain;  please take outpatient anticoagulant lovenox for this condition; please follow up with vascular surgery, hematology and PCP for this condition      SECONDARY DISCHARGE DIAGNOSES  Diagnosis: Renal mass, right  Assessment and Plan of Treatment: you were found to have a right sided renal mass on CT scan;  please follow up as outpatient with urology for this    Diagnosis: Malignant neoplasm of left female breast, unspecified estrogen receptor status, unspecified site of breast  Assessment and Plan of Treatment: we are holding your tamoxifen for this condition; please follow up with your outpatient oncologist about resuming this medication PRINCIPAL DISCHARGE DIAGNOSIS  Diagnosis: Arterial occlusion  Assessment and Plan of Treatment: this is the cause of your leg pain; you received heparin and had an angiogram for this condition; please follow up with vascular surgery, hematology and PCP for this condition      SECONDARY DISCHARGE DIAGNOSES  Diagnosis: Renal mass, right  Assessment and Plan of Treatment: you were found to have a right sided renal mass on CT scan;  please follow up as outpatient with urology for this    Diagnosis: Malignant neoplasm of left female breast, unspecified estrogen receptor status, unspecified site of breast  Assessment and Plan of Treatment: we are holding your tamoxifen for this condition; please follow up with your outpatient oncologist about resuming this medication PRINCIPAL DISCHARGE DIAGNOSIS  Diagnosis: Arterial occlusion  Assessment and Plan of Treatment: this is the cause of your leg pain; you received heparin and had an angiogram for this condition; please follow up with vascular surgery, hematology and PCP for this condition      SECONDARY DISCHARGE DIAGNOSES  Diagnosis: Malignant neoplasm of left female breast, unspecified estrogen receptor status, unspecified site of breast  Assessment and Plan of Treatment: we are holding your tamoxifen for this condition; please follow up with your outpatient oncologist about resuming this medication    Diagnosis: Renal mass, right  Assessment and Plan of Treatment: you were found to have a right sided renal mass on CT scan;  please follow up as outpatient with urology for this

## 2019-08-05 NOTE — PROGRESS NOTE ADULT - ASSESSMENT
Patient is a 48y old F with occluded arterial flow in the right SFA and pop.    PLAN:   - Discussing with attending whether patient will need intervention inpatient vs outpatient.  - Will f/u Heme hypercoagulable workup    Vascular Surgery  [id number] Patient is a 48y old F with occluded arterial flow in the right SFA and pop:     PLAN:   - Pending hypercoagulability workup, planning revascularization, recommend outpatient A/C from vascular standpoint  - Follow up with Dr. Fontenot in one week   - Will f/u Heme hypercoagulable workup    Vascular Surgery  [id number]

## 2019-08-05 NOTE — DISCHARGE NOTE PROVIDER - NSDCFUADDAPPT_GEN_ALL_CORE_FT
-please see PCP 1 week following discharge -please see PCP 1 week following discharge  -please follow up with hematology 2 weeks after being off anti-coagulation  -please follow up with vascular surgery   -please follow up with your oncologist regarding the use of tamoxifen -please see PCP 1-3 days following discharge  -please follow up with hematology 2 weeks after being off anti-coagulation  -please follow up with vascular surgery   -please follow up with your oncologist regarding the use of tamoxifen -please see PCP 1-3 days following discharge  -please follow up with hematology 2 weeks after being off anti-coagulation  -please follow up with vascular surgery in 1-3 days  -please follow up with your oncologist regarding the use of tamoxifen (hold for now) -please see PCP 1-3 days following discharge  -please follow up with hematology 2 weeks after being off anti-coagulation  -please follow up with vascular surgery in 1-3 days  -please follow up with your oncologist regarding the use of tamoxifen (hold for now)  -Please make follow up with urologist in 1-2 week to discuss about your kidney mass. -please see PCP 1-3 days following discharge  -please follow up with vascular surgery (Dr. Fontenot)  within 1 week.   -please follow up with your oncologist regarding the use of tamoxifen (hold for now), and to follow up imaging for the kidney mass.  -Please make follow up with urologist in 1-2 weeks to discuss management of the kidney mass ([phone])

## 2019-08-05 NOTE — PROGRESS NOTE ADULT - SUBJECTIVE AND OBJECTIVE BOX
Authored by Dr Colten Hutton [phone] [facility]/ [id number] [facility]    Patient is a 48y old  Female who presents with a chief complaint of Right Leg Pain (05 Aug 2019 07:29)    SUBJECTIVE / OVERNIGHT EVENTS:    No acute events ON.  Patient R leg unchanged from yesterday (slightly cool, pulses palpable, no numbness or tingling).  Was constipated, however had BM (eating more prunes per patient).  No CP, SOB, fever/chills.    ROS: negative except for above    MEDICATIONS  (STANDING):  amLODIPine   Tablet 5 milliGRAM(s) Oral daily  heparin  Infusion. 1400 Unit(s)/Hr (14 mL/Hr) IV Continuous <Continuous>    MEDICATIONS  (PRN):  heparin  Injectable 7500 Unit(s) IV Push every 6 hours PRN For aPTT less than 40  heparin  Injectable 3500 Unit(s) IV Push every 6 hours PRN For aPTT between 40 - 57      Vital Signs Last 24 Hrs  T(C): 36.8 (05 Aug 2019 04:45), Max: 37.3 (04 Aug 2019 14:35)  T(F): 98.2 (05 Aug 2019 04:45), Max: 99.1 (04 Aug 2019 14:35)  HR: 55 (05 Aug 2019 04:45) (55 - 70)  BP: 111/67 (05 Aug 2019 04:45) (111/67 - 152/93)  BP(mean): --  RR: 18 (05 Aug 2019 04:45) (18 - 18)  SpO2: 95% (05 Aug 2019 04:45) (95% - 100%)  CAPILLARY BLOOD GLUCOSE        I&O's Summary      PHYSICAL EXAM  GENERAL: NAD, well-developed  EYES: conjunctiva and sclera clear  NECK: Supple, No JVD  ENT: MMM  CHEST/LUNG: Clear to auscultation bilaterally; No wheeze  HEART: Regular rate and rhythm; No murmurs  ABDOMEN: Soft, Nontender, Nondistended; Bowel sounds present  EXTREMITIES:  RLE has palpable pulse, slightly cool to touch; no edema; LLE normal  NEURO: nonfocal, AOx3  PSYCH: calm   SKIN: No rashes or lesions    LABS:                        13.2   8.0   )-----------( 382      ( 05 Aug 2019 07:02 )             38.6     08-05    139  |  106  |  8   ----------------------------<  99  4.1   |  18<L>  |  0.78    Ca    9.6      05 Aug 2019 07:00  Phos  3.8     08-05  Mg     2.1     08-05      PT/INR - ( 05 Aug 2019 07:02 )   PT: 11.6 sec;   INR: 1.01 ratio         PTT - ( 05 Aug 2019 07:02 )  PTT:74.6 sec            RADIOLOGY & ADDITIONAL TESTS:    Imaging Personally Reviewed:  Consultant(s) Notes Reviewed:    Care Discussed with Consultants/Other Providers: Authored by Dr Colten Hutton [phone] [facility]/ [id number] [facility]    Patient is a 48y old  Female who presents with a chief complaint of Right Leg Pain (05 Aug 2019 07:29)    SUBJECTIVE / OVERNIGHT EVENTS:    No acute events ON.  Patient R leg unchanged from yesterday (slightly cool, pulses palpable, numbness and tingling).  Was constipated, however had BM (eating more prunes per patient).  No CP, SOB, fever/chills.    ROS: negative except for above    MEDICATIONS  (STANDING):  amLODIPine   Tablet 5 milliGRAM(s) Oral daily  heparin  Infusion. 1400 Unit(s)/Hr (14 mL/Hr) IV Continuous <Continuous>    MEDICATIONS  (PRN):  heparin  Injectable 7500 Unit(s) IV Push every 6 hours PRN For aPTT less than 40  heparin  Injectable 3500 Unit(s) IV Push every 6 hours PRN For aPTT between 40 - 57      Vital Signs Last 24 Hrs  T(C): 36.8 (05 Aug 2019 04:45), Max: 37.3 (04 Aug 2019 14:35)  T(F): 98.2 (05 Aug 2019 04:45), Max: 99.1 (04 Aug 2019 14:35)  HR: 55 (05 Aug 2019 04:45) (55 - 70)  BP: 111/67 (05 Aug 2019 04:45) (111/67 - 152/93)  BP(mean): --  RR: 18 (05 Aug 2019 04:45) (18 - 18)  SpO2: 95% (05 Aug 2019 04:45) (95% - 100%)  CAPILLARY BLOOD GLUCOSE        I&O's Summary      PHYSICAL EXAM  GENERAL: NAD, well-developed  EYES: conjunctiva and sclera clear  NECK: Supple, No JVD  ENT: MMM  CHEST/LUNG: Clear to auscultation bilaterally; No wheeze  HEART: Regular rate and rhythm; No murmurs  ABDOMEN: Soft, Nontender, Nondistended; Bowel sounds present  EXTREMITIES:  RLE has palpable pulse, slightly cool to touch; no edema; LLE normal  NEURO: nonfocal, AOx3  PSYCH: calm   SKIN: No rashes or lesions    LABS:                        13.2   8.0   )-----------( 382      ( 05 Aug 2019 07:02 )             38.6     08-05    139  |  106  |  8   ----------------------------<  99  4.1   |  18<L>  |  0.78    Ca    9.6      05 Aug 2019 07:00  Phos  3.8     08-05  Mg     2.1     08-05      PT/INR - ( 05 Aug 2019 07:02 )   PT: 11.6 sec;   INR: 1.01 ratio         PTT - ( 05 Aug 2019 07:02 )  PTT:74.6 sec            RADIOLOGY & ADDITIONAL TESTS:    Imaging Personally Reviewed:  Consultant(s) Notes Reviewed:    Care Discussed with Consultants/Other Providers:

## 2019-08-05 NOTE — DISCHARGE NOTE PROVIDER - CARE PROVIDERS DIRECT ADDRESSES
,[email or web address] ,[email or web address]

## 2019-08-05 NOTE — DISCHARGE NOTE PROVIDER - NSDCFUADDINST_GEN_ALL_CORE_FT
please seek immediate medical attention if foot is cold or bluish in color, or if ANY worsening of symptoms.

## 2019-08-05 NOTE — DISCHARGE NOTE PROVIDER - HOSPITAL COURSE
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  Patient found to have large arterial thrombosis in the right extremity.  Patient's tamoxifen was held.  Vascular surgery has not performed acute intervention as patient had good collateral flow to hre extremity, and they believed further intervention would result in potential clotting of the stent.  A hypercoagulable workup was completed which came back negative.  The patient was also found to have a renal mass on CT consistent with probable renal cell carcinoma.  Urology was consulted and will follow up with her as an outpatient for this.  Hematology was also consulted and will follow up with her as an outpatient (they can further assess hypercoagulability when patient is off of anti-coagulation).  Patient should follow up with PCP 1 week following discharge. 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  Patient found to have large arterial thrombosis in the right extremity.  Patient's tamoxifen was held.  We will continue to hold this following discharge- please follow up with your outpatient oncologist regarding resuming this medication.  Vascular surgery has not performed acute intervention as patient had good collateral flow to hre extremity, and they believed further intervention would result in potential clotting of the stent.  A hypercoagulable workup was completed which came back negative.  The patient was also found to have a renal mass on CT consistent with probable renal cell carcinoma.  Urology was consulted and will follow up with her as an outpatient for this.  Hematology was also consulted and will follow up with her as an outpatient (they can further assess hypercoagulability when patient is off of anti-coagulation for 2 weeks).  Per vascular, patient can be placed on lovenox and follow up as an outpatient for eventual revascularization of arterial clot.  Patient should follow up with PCP 1 week following discharge. 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  Patient found to have large arterial thrombosis in the right extremity.  Patient's tamoxifen was held.  We will continue to hold this following discharge- please follow up with your outpatient oncologist regarding resuming this medication.  Vascular surgery has not performed acute intervention as patient had good collateral flow to hre extremity, and they believed further intervention would result in potential clotting of the stent.  A hypercoagulable workup was completed which came back negative.  The patient was also found to have a renal mass on CT consistent with probable renal cell carcinoma.  Urology was consulted and will follow up with her as an outpatient for this.  Hematology was also consulted and will follow up with her as an outpatient (they can further assess hypercoagulability when patient is off of anti-coagulation for 2 weeks).  Per hematology, patient can be placed on eliquis and follow up as an outpatient with vascular for eventual revascularization of arterial clot.  Patient should follow up with PCP 1 week following discharge. 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  Patient found to have large arterial thrombosis in the right extremity.  Patient's tamoxifen was held.  We will continue to hold this following discharge- please follow up with your outpatient oncologist regarding resuming this medication.  Vascular surgery has not performed acute intervention as patient had good collateral flow to hre extremity, and they believed further intervention would result in potential clotting of the stent.  A hypercoagulable workup was completed which came back negative.  The patient was also found to have a renal mass on CT consistent with probable renal cell carcinoma.  Urology was consulted and will follow up with her as an outpatient for this.  Hematology was also consulted and will follow up with her as an outpatient (they can further assess hypercoagulability when patient is off of anti-coagulation for 2 weeks).  Per hematology, patient can be placed on lovenox twice daily and follow up as an outpatient with vascular for eventual revascularization of arterial clot.  Patient should follow up with PCP 1 week following discharge. 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  Patient found to have large arterial thrombosis in the right extremity.  Patient's tamoxifen was held.  We will continue to hold this following discharge- please follow up with your outpatient oncologist regarding resuming this medication.  Vascular surgery has not performed acute intervention as patient had good collateral flow to hre extremity, and they believed further intervention would result in potential clotting of the stent.  A hypercoagulable workup was completed which came back negative.  The patient was also found to have a renal mass on CT consistent with probable renal cell carcinoma.  Urology was consulted and will follow up with her as an outpatient for this.  Hematology was also consulted and will follow up with her as an outpatient (they can further assess hypercoagulability when patient is off of anti-coagulation for 2 weeks).  Per hematology, patient can be placed on lovenox twice daily and follow up as an outpatient with vascular for eventual revascularization of arterial clot.  Patient should follow up with PCP 1-3 days following discharge. 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  Patient found to have large arterial thrombosis in the right extremity.  Patient's tamoxifen was held.  We will continue to hold this following discharge- please follow up with your outpatient oncologist regarding resuming this medication.  Vascular surgery has not performed acute intervention as patient had good collateral flow to her extremity, and they believed further intervention would result in potential clotting of the stent.  A hypercoagulable workup was completed which came back negative.  The patient was also found to have a renal mass on CT consistent with probable renal cell carcinoma.  Urology was consulted and will follow up with her as an outpatient for this.  Hematology was also consulted and will follow up with her as an outpatient (they can further assess hypercoagulability when patient is off of anti-coagulation for 2 weeks).  Per hematology, patient can be placed on lovenox twice daily and follow up as an outpatient with vascular for eventual revascularization of arterial clot.  Patient should follow up with PCP 1-3 days following discharge. 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  Patient found to have large arterial thrombosis in the right extremity.  Patient's tamoxifen was held.  We will continue to hold this following discharge- please follow up with your outpatient oncologist regarding resuming this medication.  Vascular surgery performed an angiogram on the patient and BLANK.  A hypercoagulable workup was completed which came back negative.  The patient was also found to have a renal mass on CT consistent with probable renal cell carcinoma.  Urology was consulted and will follow up with her as an outpatient for this.  Hematology was also consulted and will follow up with her as an outpatient (they can further assess hypercoagulability when patient is off of anti-coagulation for 2 weeks).  Patient can follow up with hematology after discharge to re-evaluate hypercoagulability workup.  Per vascular surgery, BRYAN  Patient should follow up with PCP 1-3 days following discharge. 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  Patient found to have large arterial thrombosis in the right extremity. Tamoxifen was held. Vascular surgery was consulted and performed angiography. She will follow up with Dr. Fontenot from vascular surgery regarding next steps as an outpatient. No intervention was performed.  A hypercoagulable workup was completed which has been negative to date. She will need to follow up with hematology to discuss this workup and have any further testing completed.   The patient was also found to have a renal mass on CT consistent with probable renal cell carcinoma.  Urology was consulted and will follow up with her as an outpatient for this. She is to continue with lovenox daily injections; lovenox teaching has been performed.

## 2019-08-06 LAB
ALBUMIN SERPL ELPH-MCNC: 3.7 G/DL — SIGNIFICANT CHANGE UP (ref 3.3–5)
ALP SERPL-CCNC: 43 U/L — SIGNIFICANT CHANGE UP (ref 40–120)
ALT FLD-CCNC: 42 U/L — SIGNIFICANT CHANGE UP (ref 10–45)
ANION GAP SERPL CALC-SCNC: 15 MMOL/L — SIGNIFICANT CHANGE UP (ref 5–17)
APTT BLD: 78.5 SEC — HIGH (ref 27.5–36.3)
AST SERPL-CCNC: 26 U/L — SIGNIFICANT CHANGE UP (ref 10–40)
BASOPHILS # BLD AUTO: 0 K/UL — SIGNIFICANT CHANGE UP (ref 0–0.2)
BASOPHILS NFR BLD AUTO: 0.6 % — SIGNIFICANT CHANGE UP (ref 0–2)
BILIRUB SERPL-MCNC: 0.2 MG/DL — SIGNIFICANT CHANGE UP (ref 0.2–1.2)
BUN SERPL-MCNC: 10 MG/DL — SIGNIFICANT CHANGE UP (ref 7–23)
CALCIUM SERPL-MCNC: 9.5 MG/DL — SIGNIFICANT CHANGE UP (ref 8.4–10.5)
CHLORIDE SERPL-SCNC: 101 MMOL/L — SIGNIFICANT CHANGE UP (ref 96–108)
CO2 SERPL-SCNC: 21 MMOL/L — LOW (ref 22–31)
CREAT SERPL-MCNC: 0.79 MG/DL — SIGNIFICANT CHANGE UP (ref 0.5–1.3)
DNA PLOIDY SPEC FC-IMP: SIGNIFICANT CHANGE UP
EOSINOPHIL # BLD AUTO: 0.3 K/UL — SIGNIFICANT CHANGE UP (ref 0–0.5)
EOSINOPHIL NFR BLD AUTO: 3.6 % — SIGNIFICANT CHANGE UP (ref 0–6)
GLUCOSE SERPL-MCNC: 89 MG/DL — SIGNIFICANT CHANGE UP (ref 70–99)
HCT VFR BLD CALC: 41.8 % — SIGNIFICANT CHANGE UP (ref 34.5–45)
HGB BLD-MCNC: 13.4 G/DL — SIGNIFICANT CHANGE UP (ref 11.5–15.5)
LYMPHOCYTES # BLD AUTO: 2.3 K/UL — SIGNIFICANT CHANGE UP (ref 1–3.3)
LYMPHOCYTES # BLD AUTO: 31.3 % — SIGNIFICANT CHANGE UP (ref 13–44)
MAGNESIUM SERPL-MCNC: 2.2 MG/DL — SIGNIFICANT CHANGE UP (ref 1.6–2.6)
MCHC RBC-ENTMCNC: 26.5 PG — LOW (ref 27–34)
MCHC RBC-ENTMCNC: 32 GM/DL — SIGNIFICANT CHANGE UP (ref 32–36)
MCV RBC AUTO: 82.8 FL — SIGNIFICANT CHANGE UP (ref 80–100)
MONOCYTES # BLD AUTO: 0.8 K/UL — SIGNIFICANT CHANGE UP (ref 0–0.9)
MONOCYTES NFR BLD AUTO: 10.3 % — SIGNIFICANT CHANGE UP (ref 2–14)
MTHFR GENE INTERPRETATION: SIGNIFICANT CHANGE UP
NEUTROPHILS # BLD AUTO: 4 K/UL — SIGNIFICANT CHANGE UP (ref 1.8–7.4)
NEUTROPHILS NFR BLD AUTO: 54.2 % — SIGNIFICANT CHANGE UP (ref 43–77)
PHOSPHATE SERPL-MCNC: 4.1 MG/DL — SIGNIFICANT CHANGE UP (ref 2.5–4.5)
PLATELET # BLD AUTO: 376 K/UL — SIGNIFICANT CHANGE UP (ref 150–400)
POTASSIUM SERPL-MCNC: 3.8 MMOL/L — SIGNIFICANT CHANGE UP (ref 3.5–5.3)
POTASSIUM SERPL-SCNC: 3.8 MMOL/L — SIGNIFICANT CHANGE UP (ref 3.5–5.3)
PROT SERPL-MCNC: 6.6 G/DL — SIGNIFICANT CHANGE UP (ref 6–8.3)
PTR INTERPRETATION: SIGNIFICANT CHANGE UP
RBC # BLD: 5.05 M/UL — SIGNIFICANT CHANGE UP (ref 3.8–5.2)
RBC # FLD: 13.3 % — SIGNIFICANT CHANGE UP (ref 10.3–14.5)
SODIUM SERPL-SCNC: 137 MMOL/L — SIGNIFICANT CHANGE UP (ref 135–145)
WBC # BLD: 7.4 K/UL — SIGNIFICANT CHANGE UP (ref 3.8–10.5)
WBC # FLD AUTO: 7.4 K/UL — SIGNIFICANT CHANGE UP (ref 3.8–10.5)

## 2019-08-06 PROCEDURE — 99233 SBSQ HOSP IP/OBS HIGH 50: CPT | Mod: GC

## 2019-08-06 PROCEDURE — 99232 SBSQ HOSP IP/OBS MODERATE 35: CPT | Mod: GC

## 2019-08-06 RX ORDER — ENOXAPARIN SODIUM 100 MG/ML
90 INJECTION SUBCUTANEOUS
Qty: 3000 | Refills: 0
Start: 2019-08-06

## 2019-08-06 RX ORDER — HEPARIN SODIUM 5000 [USP'U]/ML
7500 INJECTION INTRAVENOUS; SUBCUTANEOUS EVERY 6 HOURS
Refills: 0 | Status: DISCONTINUED | OUTPATIENT
Start: 2019-08-07 | End: 2019-08-08

## 2019-08-06 RX ORDER — APIXABAN 2.5 MG/1
1 TABLET, FILM COATED ORAL
Qty: 60 | Refills: 0
Start: 2019-08-06 | End: 2019-09-04

## 2019-08-06 RX ORDER — TAMOXIFEN CITRATE 20 MG/1
1 TABLET, FILM COATED ORAL
Qty: 0 | Refills: 0 | DISCHARGE

## 2019-08-06 RX ORDER — SODIUM CHLORIDE 9 MG/ML
1000 INJECTION, SOLUTION INTRAVENOUS
Refills: 0 | Status: DISCONTINUED | OUTPATIENT
Start: 2019-08-06 | End: 2019-08-08

## 2019-08-06 RX ORDER — HEPARIN SODIUM 5000 [USP'U]/ML
7500 INJECTION INTRAVENOUS; SUBCUTANEOUS ONCE
Refills: 0 | Status: DISCONTINUED | OUTPATIENT
Start: 2019-08-07 | End: 2019-08-07

## 2019-08-06 RX ORDER — HEPARIN SODIUM 5000 [USP'U]/ML
INJECTION INTRAVENOUS; SUBCUTANEOUS
Qty: 25000 | Refills: 0 | Status: DISCONTINUED | OUTPATIENT
Start: 2019-08-06 | End: 2019-08-08

## 2019-08-06 RX ORDER — ENOXAPARIN SODIUM 100 MG/ML
90 INJECTION SUBCUTANEOUS
Refills: 0 | Status: DISCONTINUED | OUTPATIENT
Start: 2019-08-06 | End: 2019-08-06

## 2019-08-06 RX ORDER — HEPARIN SODIUM 5000 [USP'U]/ML
INJECTION INTRAVENOUS; SUBCUTANEOUS
Qty: 25000 | Refills: 0 | Status: COMPLETED | OUTPATIENT
Start: 2019-08-06 | End: 2020-07-04

## 2019-08-06 RX ORDER — HEPARIN SODIUM 5000 [USP'U]/ML
3500 INJECTION INTRAVENOUS; SUBCUTANEOUS EVERY 6 HOURS
Refills: 0 | Status: DISCONTINUED | OUTPATIENT
Start: 2019-08-07 | End: 2019-08-08

## 2019-08-06 RX ADMIN — ENOXAPARIN SODIUM 90 MILLIGRAM(S): 100 INJECTION SUBCUTANEOUS at 17:13

## 2019-08-06 RX ADMIN — HEPARIN SODIUM 1600 UNIT(S)/HR: 5000 INJECTION INTRAVENOUS; SUBCUTANEOUS at 09:08

## 2019-08-06 NOTE — PROGRESS NOTE ADULT - ATTENDING COMMENTS
47 yo female with a hx of DCIS s/p lumpectomy (currently on tamoxifen) and HTN who was admitted for an occluded arterial flow in the right SFA and popliteal artery currently undergoing hypercoagulable workup with CTA runoff of legs showing large clot in the thigh.   Hematology consulted to help with evaluation of possible hypercoagulable state. She does report positive FH in her mother and grandfather.  Workup negative so far but still await APA antibodies and Prothrombin gene mutation  Vasculitis etiology unlikely given ESR normal and CRP only mildly elevated (less specific)  Tamoxifen is not commonly associated with arterial thromboses, though given acute thrombotic event, would hold for now as perhaps it has increased an underlying risk factor.  Continue anticoagulation with heparin and we will follow with you. 47 yo female with a hx of DCIS s/p lumpectomy (currently on tamoxifen) and HTN who was admitted for an occluded arterial flow in the right SFA and popliteal artery currently undergoing hypercoagulable workup with CTA runoff of legs showing large clot in the thigh.   Hematology consulted to help with evaluation of possible hypercoagulable state. Workup negative but she does have a 1.7 right lower pole renal mass w/ characteristics concerning for RCC - possible cause of hypercoagulability   Tamoxifen is not commonly associated with arterial thromboses, though given acute thrombotic event, would hold for now as perhaps it has increased an underlying risk factor.  Continue anticoagulation with heparin and we will follow with you.

## 2019-08-06 NOTE — PROGRESS NOTE ADULT - SUBJECTIVE AND OBJECTIVE BOX
Hematology Oncology Follow-up    INTERVAL HPI/OVERNIGHT EVENTS:  Reports no overnight events, no fevers / chills, no changes in appetite. Denies any bleeding while on anticoagulation; denies any changes in leg pain, no new thrombotic events. Denies any headaches.     Review of Systems:  General: denies fevers/chills  Head: denies HA  Eyes: denies vision change  ENT: no nosebleeds, mucosal bleeding  Respiratory: denies any chest pain or shortness of breath  Cardiovascular: denies any chest pain, palpitations  Gastrointestinal: denies any abdominal pain, nausea, vomiting  MSK: +right leg pain, stable  Neuro: denies weakness   Skin: denies rash  Psych: denies anxiety    VITAL SIGNS:  T(F): 98 (08-06-19 @ 14:21)  HR: 64 (08-06-19 @ 14:21)  BP: 129/81 (08-06-19 @ 14:21)  RR: 18 (08-06-19 @ 14:21)  SpO2: 99% (08-06-19 @ 14:21)  Wt(kg): --    08-05-19 @ 07:01  -  08-06-19 @ 07:00  --------------------------------------------------------  IN: 192 mL / OUT: 0 mL / NET: 192 mL    08-06-19 @ 07:01  -  08-06-19 @ 17:00  --------------------------------------------------------  IN: 600 mL / OUT: 0 mL / NET: 600 mL        PHYSICAL EXAM:    Constitutional: AAOx3, NAD   Eyes: PERRL, EOMI, sclera non-icteric  Neck: supple, no masses, no JVD  Respiratory: CTA b/l, no wheezing, rhonchi, rales, with normal respiratory effort  Cardiovascular: RRR, normal S1S2, no M/R/G  Gastrointestinal: soft, NTND, no masses palpable, BS normal in all four quadrants, no HSM  Extremities:  no c/c/e; pulses palpable  MSK: no obvious abnormalities.   Neurological: Grossly intact  Skin: Right leg cooler than left  Psych: normal affect    MEDICATIONS  (STANDING):  amLODIPine   Tablet 5 milliGRAM(s) Oral daily  enoxaparin Injectable 90 milliGRAM(s) SubCutaneous two times a day    MEDICATIONS  (PRN):      No Known Allergies      LABS:                        13.4   7.4   )-----------( 376      ( 06 Aug 2019 07:15 )             41.8     08-06    137  |  101  |  10  ----------------------------<  89  3.8   |  21<L>  |  0.79    Ca    9.5      06 Aug 2019 07:15  Phos  4.1     08-06  Mg     2.2     08-06    TPro  6.6  /  Alb  3.7  /  TBili  0.2  /  DBili  x   /  AST  26  /  ALT  42  /  AlkPhos  43  08-06    PT/INR - ( 05 Aug 2019 07:02 )   PT: 11.6 sec;   INR: 1.01 ratio         PTT - ( 06 Aug 2019 07:15 )  PTT:78.5 sec     Anticardiolipin Antibody Level, Total: Negative: Method: EIA (08.01.19 @ 13:54)    Beta 2 Glycoprotein 1 Antibody Screen: Negative (08.01.19 @ 13:54)    DRVVT S/C Ratio: LA NEG (08.01.19 @ 13:43)    MTHFR Gene Interpretation:   MTHFR Mutation Analysis Final Report    Accession Number: [id number]  Date Collected: 08/01/2019 10:03  Date Received: 08/01/2019 10:03  Date Reported: 08/06/2019 12:01    Specimen: Blood  Indication: R/O Hypercoaguability / Coagulopathy    Test Performed: [employer] Assay for MTHFR C677T, [id number] Mutations  ________________________________________________________________    RESULTS:    MTHFR gene; C677T mutation:   HETEROZYGOUS  MTHFR gene; [id number] mutation: NORMAL    INTERPRETATION:    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 This variant is associated with reduced MTHFR  activity (about 35%) as compared to normal individuals, but may not have an increased risk for  vascular disease. However, actual risk may be modified by synergistic interaction with several  other factors. Correlation with this patient's clinical condition and/or with results from other  relevant tests is suggested.Genetic counseling is recommended.      COMMENTS:    DNA was extracted from the sample andanalyzed for both C677T and N0374O mutations of the MTHFR  gene.  This diagnostic test was performed using the MTHFR 677 and the MTHFR 1298 kits by [employer].    This test was performed by the Molecular Pathology Lab at [facility],part of  the [facility]    Results of this DNA based analysis are highly accurate. However, rare diagnostic errors may occur  due to the presence of polymorphisms or due to other technical difficulties related to the  technology utilizedfor this analysis.    If you are a medical practitioner and need assistance with the interpretation of the results please  send your request to fax number: [phone].    Verified By: Gissel Lara, PhD, [facility],   (Electronic Signature)  Pathologist: Julius Patino M.D. (08.01.19 @ 10:03)          RADIOLOGY & ADDITIONAL TESTS:    < from: Transthoracic Echocardiogram (08.05.19 @ 10:53) >  Conclusions:  1. Normal mitral valve. Minimal mitral regurgitation.  2. Normal trileaflet aortic valve.  3. Normal left ventricular systolic function. No segmental  wall motion abnormalities.  4. Normal right ventricular size and function.  *** No previous Echo exam.    < end of copied text >    < from: MR Abdomen w/wo IV Cont (08.04.19 @ 12:02) >  PROCEDURE DATE:  08/04/2019            INTERPRETATION:  CLINICAL INFORMATION: Right renal mass.    COMPARISON: CTA lower extremity 8/1/2019    PROCEDURE:   MRI of the abdomen was performed with and without intravenous contrast.  IV Contrast: Gadavist. 10 cc administered, 0 cc discarded.      FINDINGS:    LOWER CHEST: Within normal limits.    LIVER: Normal morphology. Heterogeneous hepatic steatosis. Small   calcified granuloma right hepatic lobe. No focal hepatic mass.  BILE DUCTS: Normal caliber.  GALLBLADDER: Cholecystectomy.  SPLEEN: Within normal limits.  PANCREAS: Within normal limits.  ADRENALS: Within normal limits.  KIDNEYS/URETERS: A 1.7 cm solid, enhancing partially exophytic posterior   right lower pole renal mass. No evidence of internal bulk fat. Findings   are consistent with renal cell carcinoma until proven otherwise.    VISUALIZED PORTIONS:    BOWEL: Within normal limits.   PERITONEUM: No ascites.  VESSELS: Renal veinsand IVC are patent.  RETROPERITONEUM/LYMPH NODES: No lymphadenopathy.    ABDOMINAL WALL: Within normal limits.  BONES: Within normal limits.    IMPRESSION:     A 1.7 cm solid enhancing posterior right lower pole renal mass, renal   cell carcinoma until proven otherwise.    Heterogeneous hepatic steatosis.      < end of copied text >

## 2019-08-06 NOTE — PROGRESS NOTE ADULT - ASSESSMENT
49 yo female with a hx of DCIS s/p lumpectomy (currently on tamoxifen) and HTN who was admitted for an occluded arterial flow in the right SFA and popliteal artery currently undergoing hypercoagulable workup with CTA runoff of legs showing large clot in the thigh. Clinically stable, on heparin gtt. Hypercoagulable workup negative so far (Protein S assay pending).    #Arterial thrombosis of the RLE, r/o hypercoagulable state  -work up obtained by primary team so far reviewed: APLS screen negative (LAC negative, negative anticardiolipin and anti-glycoprotein antibody)  -Does endorse a positive family hx of possible thrombophilia (reports mother had "clot in her heart in her 40s" and "grandfather  in his 40s from MI"); genetic testing sent, though Factor V Leiden, antithrombin deficiency, and prothrombin gene mutation usually associated with VTE >>> arterial thrombosis. -Homocysteine normal level makes MTHFR mutation unlikely to explain extent of thrombosis  -Tamoxifen unlikely to cause arterial thromboses, though given acute thrombotic event, would hold for now  -Vasculitis etiology unlikely given ESR normal and CRP only mildly elevated (less specific); also denying any other constitutional symptoms of underlying rheumatic disease  -Echocardiogram unrevealing for thrombosis, reviewed  -Given otherwise negative workup and presence of renal mass concerning fo RCC, suspect hypercoagulable state might be 2/2 malignancy; urology recommendations appreciated  -Agree with heparin gtt for anticoagulation at this time; if hypercoagulable state 2/2 malignancy, would hold off on repeating hypercoagulable workup in the future  -Plan discussed with primary team, will continue to follow    Gavino Cuadra MD  Heme/Onc Fellow, PGY4  pager: [phone]

## 2019-08-06 NOTE — PROGRESS NOTE ADULT - SUBJECTIVE AND OBJECTIVE BOX
Authored by Dr Colten Hutton [phone] [facility]/ [id number] [facility]    Patient is a 48y old  Female who presents with a chief complaint of Right Leg Pain (05 Aug 2019 14:34)    SUBJECTIVE / OVERNIGHT EVENTS:    No acute events overnight.  Patient says R leg feels the same, with continued numbness in toes.  Leg slightly cool.  Denies CP, SOB, fever/chills, swelling in extremities.    ROS: negative except for above    MEDICATIONS  (STANDING):  amLODIPine   Tablet 5 milliGRAM(s) Oral daily  heparin  Infusion. 1400 Unit(s)/Hr (14 mL/Hr) IV Continuous <Continuous>    MEDICATIONS  (PRN):  heparin  Injectable 7500 Unit(s) IV Push every 6 hours PRN For aPTT less than 40  heparin  Injectable 3500 Unit(s) IV Push every 6 hours PRN For aPTT between 40 - 57      Vital Signs Last 24 Hrs  T(C): 36.6 (06 Aug 2019 04:22), Max: 36.8 (05 Aug 2019 13:54)  T(F): 97.9 (06 Aug 2019 04:22), Max: 98.2 (05 Aug 2019 13:54)  HR: 51 (06 Aug 2019 04:22) (51 - 73)  BP: 128/87 (06 Aug 2019 04:22) (111/72 - 128/88)  BP(mean): --  RR: 18 (06 Aug 2019 04:22) (18 - 18)  SpO2: 99% (06 Aug 2019 04:22) (95% - 99%)  CAPILLARY BLOOD GLUCOSE        I&O's Summary      PHYSICAL EXAM  GENERAL: NAD, well-developed  EYES: conjunctiva and sclera clear  NECK: Supple, No JVD  ENT: MMM  CHEST/LUNG: Clear to auscultation bilaterally; No wheeze  HEART: Regular rate and rhythm; No murmurs  ABDOMEN: Soft, Nontender, Nondistended; Bowel sounds present  EXTREMITIES: RLE has palpable pulse; slightly cool to touch; no edema; LLE normal  NEURO: nonfocal, AOx3  PSYCH: calm   SKIN: No rashes or lesions    LABS:                        13.2   8.0   )-----------( 382      ( 05 Aug 2019 07:02 )             38.6     08-06    137  |  101  |  10  ----------------------------<  89  3.8   |  21<L>  |  0.79    Ca    9.5      06 Aug 2019 07:15  Phos  4.1     08-06  Mg     2.2     08-06    TPro  6.6  /  Alb  3.7  /  TBili  0.2  /  DBili  x   /  AST  26  /  ALT  42  /  AlkPhos  43  08-06    PT/INR - ( 05 Aug 2019 07:02 )   PT: 11.6 sec;   INR: 1.01 ratio         PTT - ( 05 Aug 2019 07:02 )  PTT:74.6 sec            RADIOLOGY & ADDITIONAL TESTS:    Imaging Personally Reviewed:  Consultant(s) Notes Reviewed:    Care Discussed with Consultants/Other Providers:

## 2019-08-06 NOTE — PROGRESS NOTE ADULT - ATTENDING COMMENTS
clinically stable  full strength and sensation in legs/feet.  no sign of limb ischemia.  good collateral flow on images.  discharge AC in d/w heme/vascular.  will need close outpt vascular f/up.  educated patient to seek immediate medical attention if ANY worsening symptoms, specifically limb cyanosis or cold foot.    Lee Reece MD, MHA, FACP, [facility]  Pager: [phone]

## 2019-08-06 NOTE — PROGRESS NOTE ADULT - PROBLEM SELECTOR PLAN 1
Pt with a 4 mo hx of decreased perfusion and pain to the right leg, with ultrasound positive for occluded arterial flow in the right SFA and popliteal.   -holding tamoxifen per oncologist Dr. Blackmon  - Vascular Surgery Consulted -> no intervention at this time, OK to D/C on lovenox  - Heparin gtt currently  - Heme/Onc consulted, will f/u OP after off anticoagulation for 2 weeks to re-eval hypercoag workup (which was negative inpatient) Pt with a 4 mo hx of decreased perfusion and pain to the right leg, with ultrasound positive for occluded arterial flow in the right SFA and popliteal.   -holding tamoxifen per oncologist Dr. Blackmon  - Vascular Surgery Consulted -> no intervention at this time, OK to D/C on lovenox  - Heparin gtt currently, will transition to lovenox 90 mg bid  - Heme/Onc consulted, will f/u OP after off anticoagulation for 2 weeks to re-eval hypercoag workup (which was negative inpatient)

## 2019-08-07 ENCOUNTER — TRANSCRIPTION ENCOUNTER (OUTPATIENT)
Age: 49
End: 2019-08-07

## 2019-08-07 LAB
ALBUMIN SERPL ELPH-MCNC: 3.7 G/DL — SIGNIFICANT CHANGE UP (ref 3.3–5)
ALP SERPL-CCNC: 42 U/L — SIGNIFICANT CHANGE UP (ref 40–120)
ALT FLD-CCNC: 40 U/L — SIGNIFICANT CHANGE UP (ref 10–45)
ANION GAP SERPL CALC-SCNC: 13 MMOL/L — SIGNIFICANT CHANGE UP (ref 5–17)
APTT BLD: 27.9 SEC — SIGNIFICANT CHANGE UP (ref 27.5–36.3)
APTT BLD: 89.3 SEC — HIGH (ref 27.5–36.3)
APTT BLD: 97 SEC — HIGH (ref 27.5–36.3)
AST SERPL-CCNC: 22 U/L — SIGNIFICANT CHANGE UP (ref 10–40)
BASOPHILS # BLD AUTO: 0 K/UL — SIGNIFICANT CHANGE UP (ref 0–0.2)
BASOPHILS NFR BLD AUTO: 0.5 % — SIGNIFICANT CHANGE UP (ref 0–2)
BILIRUB SERPL-MCNC: 0.2 MG/DL — SIGNIFICANT CHANGE UP (ref 0.2–1.2)
BLD GP AB SCN SERPL QL: NEGATIVE — SIGNIFICANT CHANGE UP
BUN SERPL-MCNC: 10 MG/DL — SIGNIFICANT CHANGE UP (ref 7–23)
CALCIUM SERPL-MCNC: 9.6 MG/DL — SIGNIFICANT CHANGE UP (ref 8.4–10.5)
CHLORIDE SERPL-SCNC: 105 MMOL/L — SIGNIFICANT CHANGE UP (ref 96–108)
CO2 SERPL-SCNC: 21 MMOL/L — LOW (ref 22–31)
CREAT SERPL-MCNC: 0.81 MG/DL — SIGNIFICANT CHANGE UP (ref 0.5–1.3)
EOSINOPHIL # BLD AUTO: 0.3 K/UL — SIGNIFICANT CHANGE UP (ref 0–0.5)
EOSINOPHIL NFR BLD AUTO: 4.1 % — SIGNIFICANT CHANGE UP (ref 0–6)
GLUCOSE SERPL-MCNC: 94 MG/DL — SIGNIFICANT CHANGE UP (ref 70–99)
HCG SERPL-ACNC: <2 MIU/ML — SIGNIFICANT CHANGE UP
HCT VFR BLD CALC: 41.1 % — SIGNIFICANT CHANGE UP (ref 34.5–45)
HCT VFR BLD CALC: 41.9 % — SIGNIFICANT CHANGE UP (ref 34.5–45)
HGB BLD-MCNC: 13.3 G/DL — SIGNIFICANT CHANGE UP (ref 11.5–15.5)
HGB BLD-MCNC: 13.6 G/DL — SIGNIFICANT CHANGE UP (ref 11.5–15.5)
INR BLD: 0.98 RATIO — SIGNIFICANT CHANGE UP (ref 0.88–1.16)
LYMPHOCYTES # BLD AUTO: 2.2 K/UL — SIGNIFICANT CHANGE UP (ref 1–3.3)
LYMPHOCYTES # BLD AUTO: 35.3 % — SIGNIFICANT CHANGE UP (ref 13–44)
MAGNESIUM SERPL-MCNC: 2.2 MG/DL — SIGNIFICANT CHANGE UP (ref 1.6–2.6)
MCHC RBC-ENTMCNC: 26.7 PG — LOW (ref 27–34)
MCHC RBC-ENTMCNC: 26.9 PG — LOW (ref 27–34)
MCHC RBC-ENTMCNC: 32.4 GM/DL — SIGNIFICANT CHANGE UP (ref 32–36)
MCHC RBC-ENTMCNC: 32.4 GM/DL — SIGNIFICANT CHANGE UP (ref 32–36)
MCV RBC AUTO: 82.3 FL — SIGNIFICANT CHANGE UP (ref 80–100)
MCV RBC AUTO: 82.9 FL — SIGNIFICANT CHANGE UP (ref 80–100)
MONOCYTES # BLD AUTO: 0.6 K/UL — SIGNIFICANT CHANGE UP (ref 0–0.9)
MONOCYTES NFR BLD AUTO: 9.6 % — SIGNIFICANT CHANGE UP (ref 2–14)
NEUTROPHILS # BLD AUTO: 3.1 K/UL — SIGNIFICANT CHANGE UP (ref 1.8–7.4)
NEUTROPHILS NFR BLD AUTO: 50.5 % — SIGNIFICANT CHANGE UP (ref 43–77)
PHOSPHATE SERPL-MCNC: 4 MG/DL — SIGNIFICANT CHANGE UP (ref 2.5–4.5)
PLATELET # BLD AUTO: 336 K/UL — SIGNIFICANT CHANGE UP (ref 150–400)
PLATELET # BLD AUTO: 370 K/UL — SIGNIFICANT CHANGE UP (ref 150–400)
POTASSIUM SERPL-MCNC: 4.5 MMOL/L — SIGNIFICANT CHANGE UP (ref 3.5–5.3)
POTASSIUM SERPL-SCNC: 4.5 MMOL/L — SIGNIFICANT CHANGE UP (ref 3.5–5.3)
PROT SERPL-MCNC: 6.6 G/DL — SIGNIFICANT CHANGE UP (ref 6–8.3)
PROTHROM AB SERPL-ACNC: 11.3 SEC — SIGNIFICANT CHANGE UP (ref 10–12.9)
RBC # BLD: 4.96 M/UL — SIGNIFICANT CHANGE UP (ref 3.8–5.2)
RBC # BLD: 5.09 M/UL — SIGNIFICANT CHANGE UP (ref 3.8–5.2)
RBC # FLD: 13.2 % — SIGNIFICANT CHANGE UP (ref 10.3–14.5)
RBC # FLD: 13.3 % — SIGNIFICANT CHANGE UP (ref 10.3–14.5)
RH IG SCN BLD-IMP: POSITIVE — SIGNIFICANT CHANGE UP
SODIUM SERPL-SCNC: 139 MMOL/L — SIGNIFICANT CHANGE UP (ref 135–145)
WBC # BLD: 6.1 K/UL — SIGNIFICANT CHANGE UP (ref 3.8–10.5)
WBC # BLD: 6.3 K/UL — SIGNIFICANT CHANGE UP (ref 3.8–10.5)
WBC # FLD AUTO: 6.1 K/UL — SIGNIFICANT CHANGE UP (ref 3.8–10.5)
WBC # FLD AUTO: 6.3 K/UL — SIGNIFICANT CHANGE UP (ref 3.8–10.5)

## 2019-08-07 PROCEDURE — 99233 SBSQ HOSP IP/OBS HIGH 50: CPT | Mod: GC

## 2019-08-07 RX ADMIN — HEPARIN SODIUM 1700 UNIT(S)/HR: 5000 INJECTION INTRAVENOUS; SUBCUTANEOUS at 13:07

## 2019-08-07 RX ADMIN — HEPARIN SODIUM 1700 UNIT(S)/HR: 5000 INJECTION INTRAVENOUS; SUBCUTANEOUS at 23:28

## 2019-08-07 RX ADMIN — HEPARIN SODIUM 1700 UNIT(S)/HR: 5000 INJECTION INTRAVENOUS; SUBCUTANEOUS at 05:48

## 2019-08-07 NOTE — PROVIDER CONTACT NOTE (OTHER) - BACKGROUND
Pt is on a heparin drip but it was started at 5am, so this PTT is too early to give bolus/adjust rate.

## 2019-08-07 NOTE — PROGRESS NOTE ADULT - SUBJECTIVE AND OBJECTIVE BOX
VASCULAR SURGERY PROGRESS NOTE    48yFemale    SUBJECTIVE:    Patient seen and examined at bedside during morning rounds. No acute events overnight. Patient denies any pain or changes with her right lower extremity. She denies fever, chills, nausea, vomiting, chest pain or shortness of breath.   --------------------------------------------------------------------------------------------------  OBJECTIVE:     Physical Exam:  General: AAOx3, NAD, resting comfortably  HEENT: NCAT  Respiratory: no increased work of breathing  Abdomen: nontender, nondistended, soft  Extremities: no edema, warm well perfused, L palpable PT, R no DP/PT signals    --------------------------------------------------------------------------------------------------  Vital Signs:    Vital Signs Last 24 Hrs  T(C): 36.7 (06 Aug 2019 22:28), Max: 36.7 (06 Aug 2019 14:21)  T(F): 98 (06 Aug 2019 22:28), Max: 98 (06 Aug 2019 14:21)  HR: 53 (06 Aug 2019 22:28) (53 - 64)  BP: 127/82 (06 Aug 2019 22:28) (127/82 - 129/81)  BP(mean): --  RR: 18 (06 Aug 2019 22:28) (18 - 18)  SpO2: 97% (06 Aug 2019 22:28) (97% - 99%)      --------------------------------------------------------------------------------------------------  Inputs/Outputs:    06 Aug 2019 07:01  -  07 Aug 2019 07:00  --------------------------------------------------------  IN:    heparin  Infusion.: 35 mL    Oral Fluid: 600 mL  Total IN: 635 mL    OUT:  Total OUT: 0 mL    Total NET: 635 mL    --------------------------------------------------------------------------------------------------  Laboratories:                     13.3   6.1   )-----------( 336      ( 07 Aug 2019 07:11 )             41.1     07 Aug 2019 07:08    139    |  105    |  10     ----------------------------<  94     4.5     |  21     |  0.81     Ca    9.6        07 Aug 2019 07:08  Phos  4.0       07 Aug 2019 07:08  Mg     2.2       07 Aug 2019 07:08    TPro  6.6    /  Alb  3.7    /  TBili  0.2    /  DBili  x      /  AST  22     /  ALT  40     /  AlkPhos  42     07 Aug 2019 07:08    PT/INR - ( 07 Aug 2019 07:11 )   PT: 11.3 sec;   INR: 0.98 ratio    PTT - ( 07 Aug 2019 07:11 )  PTT:27.9 sec    LIVER FUNCTIONS - ( 07 Aug 2019 07:08 )  Alb: 3.7 g/dL / Pro: 6.6 g/dL / ALK PHOS: 42 U/L / ALT: 40 U/L / AST: 22 U/L / GGT: x           --------------------------------------------------------------------------------------------------    Medications:  MEDICATIONS  (STANDING):  amLODIPine   Tablet 5 milliGRAM(s) Oral daily  dextrose 5% + sodium chloride 0.45% 1000 milliLiter(s) (75 mL/Hr) IV Continuous <Continuous>  heparin  Infusion.  Unit(s)/Hr (17 mL/Hr) IV Continuous <Continuous>    MEDICATIONS  (PRN):  heparin  Injectable 7500 Unit(s) IV Push every 6 hours PRN For aPTT less than 40  heparin  Injectable 3500 Unit(s) IV Push every 6 hours PRN For aPTT between 40 - 57

## 2019-08-07 NOTE — PROGRESS NOTE ADULT - ASSESSMENT
ASSESSMENT:   Patient is a 48y old female with occluded arterial flow in the right SFA and popliteal artery.      PLAN:   - For Right lower extremity angiogram, possibly thrombectomy, possible thrombolysis today   - NPO for OR   - Hold heparin gtt on call to OR    Vascular Surgery  [phone]

## 2019-08-07 NOTE — PROGRESS NOTE ADULT - ATTENDING COMMENTS
await vascular intervention; initially there was no plan for it.  f/up vascular/heme recs.  monitor for signs of limb ischemia    Lee Reece MD, MHA, FACP, [facility]  Pager: [phone] await vascular intervention; initially there was no plan for it.  f/up vascular/heme recs.  monitor for signs of limb ischemia.    Patient low cardiac risk based on Zero RCRI risk factors and low Dean score.  However, she is high risk for distal embolization and limb ischemia.  May proceed to OR without further cardiac testing; as medically optimized as can be.  AC as per vascular.    Lee Reece MD, MHA, FACP, [facility]  Pager: [phone]

## 2019-08-07 NOTE — PROVIDER CONTACT NOTE (OTHER) - SITUATION
PTT on morning labs =27.9
MRI transport on the floor to take patient for MRI abdomen. Pt is on Heparin gtt. I sit ok to stop heparin gtt. MRI will take over an hour.

## 2019-08-07 NOTE — PRE-ANESTHESIA EVALUATION ADULT - NSANTHPMHFT_GEN_ALL_CORE
47 yo female with a hx of DCIS s/p lumpectomy (currently on tamoxifen) and HTN who was admitted for an occluded arterial flow in the right SFA and popliteal artery on heparin gtt. 47 yo female with a hx of DCIS s/p lumpectomy (currently on tamoxifen) and HTN who was admitted for an occluded arterial flow in the right SFA and popliteal artery on heparin gtt.    denies CP, SOB, hx of TIA/stroke.

## 2019-08-07 NOTE — PROGRESS NOTE ADULT - SUBJECTIVE AND OBJECTIVE BOX
Authored by Dr Colten Hutton [phone] [facility]/ [id number] [facility]    Patient is a 48y old  Female who presents with a chief complaint of Right Leg Pain (06 Aug 2019 17:00)    SUBJECTIVE / OVERNIGHT EVENTS:    Yesterday evening, vascular decided would like to intervene surgically on patient's thrombus.  Patient restarted on heparin, made NPO and placed on fluid for procedure today.  Leg remains status quo (slightly cool to touch, numbness in toe, slight pain with movement all unchanged from yesterday).  No CP, SOB, fever/chills, swelling in other extremities.    ROS: negative except for above    MEDICATIONS  (STANDING):  amLODIPine   Tablet 5 milliGRAM(s) Oral daily  dextrose 5% + sodium chloride 0.45% 1000 milliLiter(s) (75 mL/Hr) IV Continuous <Continuous>  heparin  Infusion.  Unit(s)/Hr (17 mL/Hr) IV Continuous <Continuous>  heparin  Injectable 7500 Unit(s) IV Push once    MEDICATIONS  (PRN):  heparin  Injectable 7500 Unit(s) IV Push every 6 hours PRN For aPTT less than 40  heparin  Injectable 3500 Unit(s) IV Push every 6 hours PRN For aPTT between 40 - 57      Vital Signs Last 24 Hrs  T(C): 36.7 (06 Aug 2019 22:28), Max: 36.7 (06 Aug 2019 14:21)  T(F): 98 (06 Aug 2019 22:28), Max: 98 (06 Aug 2019 14:21)  HR: 53 (06 Aug 2019 22:28) (53 - 64)  BP: 127/82 (06 Aug 2019 22:28) (115/75 - 129/81)  BP(mean): --  RR: 18 (06 Aug 2019 22:28) (18 - 18)  SpO2: 97% (06 Aug 2019 22:28) (97% - 99%)  CAPILLARY BLOOD GLUCOSE        I&O's Summary    06 Aug 2019 07:01  -  07 Aug 2019 07:00  --------------------------------------------------------  IN: 600 mL / OUT: 0 mL / NET: 600 mL        PHYSICAL EXAM  GENERAL: NAD, well-developed  EYES: conjunctiva and sclera clear  NECK: Supple, No JVD  ENT: MMM  CHEST/LUNG: Clear to auscultation bilaterally; No wheeze  HEART: Regular rate and rhythm; No murmurs  ABDOMEN: Soft, Nontender, Nondistended; Bowel sounds present  EXTREMITIES:  RLE palpable pulses, slightly cool to touch, no edema; LLE WNL  NEURO: nonfocal, AOx3  PSYCH: calm   SKIN: No rashes or lesions    LABS:                        13.4   7.4   )-----------( 376      ( 06 Aug 2019 07:15 )             41.8     08-06    137  |  101  |  10  ----------------------------<  89  3.8   |  21<L>  |  0.79    Ca    9.5      06 Aug 2019 07:15  Phos  4.1     08-06  Mg     2.2     08-06    TPro  6.6  /  Alb  3.7  /  TBili  0.2  /  DBili  x   /  AST  26  /  ALT  42  /  AlkPhos  43  08-06    PTT - ( 06 Aug 2019 07:15 )  PTT:78.5 sec            RADIOLOGY & ADDITIONAL TESTS:    Imaging Personally Reviewed:  Consultant(s) Notes Reviewed:    Care Discussed with Consultants/Other Providers:

## 2019-08-07 NOTE — PROVIDER CONTACT NOTE (OTHER) - ACTION/TREATMENT ORDERED:
Do not hold heparin gtt. Will do the MRI tomorrow.
Yes, draw at 11am. But stop drip just before pt goes to vascular lab.

## 2019-08-07 NOTE — PROGRESS NOTE ADULT - PROBLEM SELECTOR PLAN 1
Pt with a 4 mo hx of decreased perfusion and pain to the right leg, with ultrasound positive for occluded arterial flow in the right SFA and popliteal.   -holding tamoxifen per oncologist Dr. Blackmon  - Vascular Surgery performing angio in OR today  - Heparin gtt currently, will hold for surgery  - Heme/Onc consulted, will f/u OP after off anticoagulation for 2 weeks to re-eval hypercoag workup (which was negative inpatient except for heterozygous MTFR gene mutation which could predispose to clot) Pt with a 4 mo hx of decreased perfusion and pain to the right leg, with ultrasound positive for occluded arterial flow in the right SFA and popliteal.   -holding tamoxifen per oncologist Dr. Blackmon  - Vascular Surgery performing angio in OR today  - Heparin gtt currently, will hold for surgery  - Heme/Onc consulted, will f/u OP after off anticoagulation for 2 weeks to re-eval hypercoag workup (which was negative inpatient except for heterozygous MTFR gene mutation which could predispose to clot)  -patient has RCRI score of 0 and Dean risk of 0.11% --> patient is medically cleared for procedure  -f/u vascular recs following procedure

## 2019-08-08 LAB
ALBUMIN SERPL ELPH-MCNC: 3.7 G/DL — SIGNIFICANT CHANGE UP (ref 3.3–5)
ALP SERPL-CCNC: 41 U/L — SIGNIFICANT CHANGE UP (ref 40–120)
ALT FLD-CCNC: 41 U/L — SIGNIFICANT CHANGE UP (ref 10–45)
ANION GAP SERPL CALC-SCNC: 12 MMOL/L — SIGNIFICANT CHANGE UP (ref 5–17)
APTT BLD: 109.9 SEC — HIGH (ref 27.5–36.3)
APTT BLD: 74.7 SEC — HIGH (ref 27.5–36.3)
AST SERPL-CCNC: 23 U/L — SIGNIFICANT CHANGE UP (ref 10–40)
BASOPHILS # BLD AUTO: 0 K/UL — SIGNIFICANT CHANGE UP (ref 0–0.2)
BASOPHILS NFR BLD AUTO: 0.5 % — SIGNIFICANT CHANGE UP (ref 0–2)
BILIRUB SERPL-MCNC: 0.2 MG/DL — SIGNIFICANT CHANGE UP (ref 0.2–1.2)
BUN SERPL-MCNC: 9 MG/DL — SIGNIFICANT CHANGE UP (ref 7–23)
CALCIUM SERPL-MCNC: 9.2 MG/DL — SIGNIFICANT CHANGE UP (ref 8.4–10.5)
CHLORIDE SERPL-SCNC: 104 MMOL/L — SIGNIFICANT CHANGE UP (ref 96–108)
CO2 SERPL-SCNC: 22 MMOL/L — SIGNIFICANT CHANGE UP (ref 22–31)
CREAT SERPL-MCNC: 0.77 MG/DL — SIGNIFICANT CHANGE UP (ref 0.5–1.3)
EOSINOPHIL # BLD AUTO: 0.2 K/UL — SIGNIFICANT CHANGE UP (ref 0–0.5)
EOSINOPHIL NFR BLD AUTO: 3.3 % — SIGNIFICANT CHANGE UP (ref 0–6)
GLUCOSE SERPL-MCNC: 98 MG/DL — SIGNIFICANT CHANGE UP (ref 70–99)
HCT VFR BLD CALC: 39.7 % — SIGNIFICANT CHANGE UP (ref 34.5–45)
HCT VFR BLD CALC: 41.4 % — SIGNIFICANT CHANGE UP (ref 34.5–45)
HGB BLD-MCNC: 12.8 G/DL — SIGNIFICANT CHANGE UP (ref 11.5–15.5)
HGB BLD-MCNC: 13.6 G/DL — SIGNIFICANT CHANGE UP (ref 11.5–15.5)
LYMPHOCYTES # BLD AUTO: 2.3 K/UL — SIGNIFICANT CHANGE UP (ref 1–3.3)
LYMPHOCYTES # BLD AUTO: 36.5 % — SIGNIFICANT CHANGE UP (ref 13–44)
MAGNESIUM SERPL-MCNC: 2 MG/DL — SIGNIFICANT CHANGE UP (ref 1.6–2.6)
MCHC RBC-ENTMCNC: 26.8 PG — LOW (ref 27–34)
MCHC RBC-ENTMCNC: 27 PG — SIGNIFICANT CHANGE UP (ref 27–34)
MCHC RBC-ENTMCNC: 32.3 GM/DL — SIGNIFICANT CHANGE UP (ref 32–36)
MCHC RBC-ENTMCNC: 33 GM/DL — SIGNIFICANT CHANGE UP (ref 32–36)
MCV RBC AUTO: 82 FL — SIGNIFICANT CHANGE UP (ref 80–100)
MCV RBC AUTO: 82.8 FL — SIGNIFICANT CHANGE UP (ref 80–100)
MONOCYTES # BLD AUTO: 0.6 K/UL — SIGNIFICANT CHANGE UP (ref 0–0.9)
MONOCYTES NFR BLD AUTO: 9.4 % — SIGNIFICANT CHANGE UP (ref 2–14)
NEUTROPHILS # BLD AUTO: 3.2 K/UL — SIGNIFICANT CHANGE UP (ref 1.8–7.4)
NEUTROPHILS NFR BLD AUTO: 50.3 % — SIGNIFICANT CHANGE UP (ref 43–77)
PHOSPHATE SERPL-MCNC: 4 MG/DL — SIGNIFICANT CHANGE UP (ref 2.5–4.5)
PLATELET # BLD AUTO: 358 K/UL — SIGNIFICANT CHANGE UP (ref 150–400)
PLATELET # BLD AUTO: 365 K/UL — SIGNIFICANT CHANGE UP (ref 150–400)
POTASSIUM SERPL-MCNC: 4 MMOL/L — SIGNIFICANT CHANGE UP (ref 3.5–5.3)
POTASSIUM SERPL-SCNC: 4 MMOL/L — SIGNIFICANT CHANGE UP (ref 3.5–5.3)
PROT S FREE PPP-ACNC: 84 % NORMAL — SIGNIFICANT CHANGE UP (ref 60–140)
PROT SERPL-MCNC: 6.2 G/DL — SIGNIFICANT CHANGE UP (ref 6–8.3)
RBC # BLD: 4.79 M/UL — SIGNIFICANT CHANGE UP (ref 3.8–5.2)
RBC # BLD: 5.05 M/UL — SIGNIFICANT CHANGE UP (ref 3.8–5.2)
RBC # FLD: 13.2 % — SIGNIFICANT CHANGE UP (ref 10.3–14.5)
RBC # FLD: 13.3 % — SIGNIFICANT CHANGE UP (ref 10.3–14.5)
SODIUM SERPL-SCNC: 138 MMOL/L — SIGNIFICANT CHANGE UP (ref 135–145)
WBC # BLD: 6.3 K/UL — SIGNIFICANT CHANGE UP (ref 3.8–10.5)
WBC # BLD: 6.9 K/UL — SIGNIFICANT CHANGE UP (ref 3.8–10.5)
WBC # FLD AUTO: 6.3 K/UL — SIGNIFICANT CHANGE UP (ref 3.8–10.5)
WBC # FLD AUTO: 6.9 K/UL — SIGNIFICANT CHANGE UP (ref 3.8–10.5)

## 2019-08-08 PROCEDURE — 75710 ARTERY X-RAYS ARM/LEG: CPT | Mod: 26

## 2019-08-08 PROCEDURE — 36246 INS CATH ABD/L-EXT ART 2ND: CPT

## 2019-08-08 PROCEDURE — 99233 SBSQ HOSP IP/OBS HIGH 50: CPT

## 2019-08-08 PROCEDURE — 76937 US GUIDE VASCULAR ACCESS: CPT | Mod: 26

## 2019-08-08 RX ORDER — HEPARIN SODIUM 5000 [USP'U]/ML
3500 INJECTION INTRAVENOUS; SUBCUTANEOUS EVERY 6 HOURS
Refills: 0 | Status: DISCONTINUED | OUTPATIENT
Start: 2019-08-08 | End: 2019-08-08

## 2019-08-08 RX ORDER — ACETAMINOPHEN 500 MG
1000 TABLET ORAL ONCE
Refills: 0 | Status: COMPLETED | OUTPATIENT
Start: 2019-08-08 | End: 2019-08-08

## 2019-08-08 RX ORDER — ONDANSETRON 8 MG/1
4 TABLET, FILM COATED ORAL ONCE
Refills: 0 | Status: DISCONTINUED | OUTPATIENT
Start: 2019-08-08 | End: 2019-08-08

## 2019-08-08 RX ORDER — AMLODIPINE BESYLATE 2.5 MG/1
5 TABLET ORAL DAILY
Refills: 0 | Status: DISCONTINUED | OUTPATIENT
Start: 2019-08-08 | End: 2019-08-09

## 2019-08-08 RX ORDER — HEPARIN SODIUM 5000 [USP'U]/ML
7500 INJECTION INTRAVENOUS; SUBCUTANEOUS EVERY 6 HOURS
Refills: 0 | Status: DISCONTINUED | OUTPATIENT
Start: 2019-08-08 | End: 2019-08-08

## 2019-08-08 RX ORDER — HYDROMORPHONE HYDROCHLORIDE 2 MG/ML
0.5 INJECTION INTRAMUSCULAR; INTRAVENOUS; SUBCUTANEOUS
Refills: 0 | Status: DISCONTINUED | OUTPATIENT
Start: 2019-08-08 | End: 2019-08-08

## 2019-08-08 RX ORDER — HEPARIN SODIUM 5000 [USP'U]/ML
INJECTION INTRAVENOUS; SUBCUTANEOUS
Qty: 25000 | Refills: 0 | Status: DISCONTINUED | OUTPATIENT
Start: 2019-08-08 | End: 2019-08-09

## 2019-08-08 RX ADMIN — HEPARIN SODIUM 1700 UNIT(S)/HR: 5000 INJECTION INTRAVENOUS; SUBCUTANEOUS at 13:14

## 2019-08-08 RX ADMIN — Medication 400 MILLIGRAM(S): at 10:15

## 2019-08-08 RX ADMIN — HEPARIN SODIUM 1700 UNIT(S)/HR: 5000 INJECTION INTRAVENOUS; SUBCUTANEOUS at 21:58

## 2019-08-08 RX ADMIN — Medication 1000 MILLIGRAM(S): at 10:30

## 2019-08-08 RX ADMIN — AMLODIPINE BESYLATE 5 MILLIGRAM(S): 2.5 TABLET ORAL at 11:05

## 2019-08-08 NOTE — PROGRESS NOTE ADULT - PROBLEM SELECTOR PLAN 1
Pt with a 4 mo hx of decreased perfusion and pain to the right leg, with ultrasound positive for occluded arterial flow in the right SFA and popliteal.   -holding tamoxifen per oncologist Dr. Blackmon  - Vascular Surgery performing angio in OR today  - Heparin gtt held for vascular procedure today  - Heme/Onc consulted, will f/u OP after off anticoagulation for 2 weeks to re-eval hypercoag workup (which was negative inpatient except for heterozygous MTFR gene mutation which could predispose to clot)  -patient has RCRI score of 0 and Dean risk of 0.11% --> patient is medically cleared for procedure  -f/u vascular recs following procedure

## 2019-08-08 NOTE — PROGRESS NOTE ADULT - PROBLEM SELECTOR PLAN 5
Diet: DASH/TLD  DVT Prophylaxis:  Heparin gtt  Disposition: requires inpatient level of care. Diet: DASH/TLC  DVT Prophylaxis:  Heparin gtt  Disposition: requires inpatient level of care.

## 2019-08-08 NOTE — CHART NOTE - NSCHARTNOTEFT_GEN_A_CORE
Vascular Surgery Post-Operative Note    Post-op Dx: Peripheral artery disease  Surgeon: Dr. Alicia  Procedure: Right lower extremity angiogram    --------------------------------------------------------------------------------------------------  OBJECTIVE:   Physical Exam:  General: AAOx3, NAD, lying comfortably in bed  HEENT: NC/AT  Respiratory: nonlabored breathing  Abdomen: non-distended, soft, non-tender, incision site dressing c/d/i  Extremities:  Drain:  Ostomy:   --------------------------------------------------------------------------------------------------  Vital Signs:    Vital Signs Last 24 Hrs  T(C): 36.7 (08 Aug 2019 04:14), Max: 37 (07 Aug 2019 21:25)  T(F): 98 (08 Aug 2019 04:14), Max: 98.6 (07 Aug 2019 21:25)  HR: 52 (08 Aug 2019 13:30) (51 - 70)  BP: 141/82 (08 Aug 2019 13:30) (114/79 - 170/103)  BP(mean): 106 (08 Aug 2019 13:30) (106 - 125)  RR: 16 (08 Aug 2019 13:30) (16 - 18)  SpO2: 100% (08 Aug 2019 13:30) (96% - 100%)  --------------------------------------------------------------------------------------------------  Inputs/Outputs:    07 Aug 2019 07:01  -  08 Aug 2019 07:00  --------------------------------------------------------  IN:    Oral Fluid: 360 mL  Total IN: 360 mL    OUT:  Total OUT: 0 mL    Total NET: 360 mL    08 Aug 2019 07:01  -  08 Aug 2019 14:50  --------------------------------------------------------  IN:    heparin  Infusion.: 17 mL  Total IN: 17 mL    OUT:    Indwelling Catheter - Urethral: 410 mL  Total OUT: 410 mL    Total NET: -393 mL  -------------------------------------------------------------------------------------------------  Laboratories:                          12.8   6.3   )-----------( 358      ( 08 Aug 2019 06:43 )             39.7     08 Aug 2019 06:43    138    |  104    |  9      ----------------------------<  98     4.0     |  22     |  0.77     Ca    9.2        08 Aug 2019 06:43  Phos  4.0       08 Aug 2019 06:43  Mg     2.0       08 Aug 2019 06:43    TPro  6.2    /  Alb  3.7    /  TBili  0.2    /  DBili  x      /  AST  23     /  ALT  41     /  AlkPhos  41     08 Aug 2019 06:43    PT/INR - ( 07 Aug 2019 07:11 )   PT: 11.3 sec;   INR: 0.98 ratio         PTT - ( 08 Aug 2019 06:43 )  PTT:109.9 sec    LIVER FUNCTIONS - ( 08 Aug 2019 06:43 )  Alb: 3.7 g/dL / Pro: 6.2 g/dL / ALK PHOS: 41 U/L / ALT: 41 U/L / AST: 23 U/L / GGT: x           --------------------------------------------------------------------------------------------------    MEDICATIONS  (STANDING):  amLODIPine   Tablet 5 milliGRAM(s) Oral daily  heparin  Infusion.  Unit(s)/Hr (17 mL/Hr) IV Continuous <Continuous>    MEDICATIONS  (PRN):  HYDROmorphone  Injectable 0.5 milliGRAM(s) IV Push every 10 minutes PRN Moderate Pain (4 - 6)  ondansetron Injectable 4 milliGRAM(s) IV Push once PRN Nausea and/or Vomiting    A/P: Patient is a 48y old female with occluded arterial flow in the right SFA and popliteal artery.    PLAN:  - Continue DVT ppx  - Continue excellent care per primary team Vascular Surgery Post-Operative Note    Post-op Dx: Peripheral artery disease  Surgeon: Dr. Alicia  Procedure: Right lower extremity angiogram    --------------------------------------------------------------------------------------------------  OBJECTIVE:   Physical Exam:  General: AAOx3, NAD, lying comfortably in bed  HEENT: NC/AT  Respiratory: nonlabored breathing  Abdomen: non-distended, soft, non-tender,   Extremities: no edema, warm well perfused, L palpable PT, R no DP/PT signals      --------------------------------------------------------------------------------------------------  Vital Signs:    Vital Signs Last 24 Hrs  T(C): 36.7 (08 Aug 2019 04:14), Max: 37 (07 Aug 2019 21:25)  T(F): 98 (08 Aug 2019 04:14), Max: 98.6 (07 Aug 2019 21:25)  HR: 52 (08 Aug 2019 13:30) (51 - 70)  BP: 141/82 (08 Aug 2019 13:30) (114/79 - 170/103)  BP(mean): 106 (08 Aug 2019 13:30) (106 - 125)  RR: 16 (08 Aug 2019 13:30) (16 - 18)  SpO2: 100% (08 Aug 2019 13:30) (96% - 100%)  --------------------------------------------------------------------------------------------------  Inputs/Outputs:    07 Aug 2019 07:01  -  08 Aug 2019 07:00  --------------------------------------------------------  IN:    Oral Fluid: 360 mL  Total IN: 360 mL    OUT:  Total OUT: 0 mL    Total NET: 360 mL    08 Aug 2019 07:01  -  08 Aug 2019 14:50  --------------------------------------------------------  IN:    heparin  Infusion.: 17 mL  Total IN: 17 mL    OUT:    Indwelling Catheter - Urethral: 410 mL  Total OUT: 410 mL    Total NET: -393 mL  -------------------------------------------------------------------------------------------------  Laboratories:                          12.8   6.3   )-----------( 358      ( 08 Aug 2019 06:43 )             39.7     08 Aug 2019 06:43    138    |  104    |  9      ----------------------------<  98     4.0     |  22     |  0.77     Ca    9.2        08 Aug 2019 06:43  Phos  4.0       08 Aug 2019 06:43  Mg     2.0       08 Aug 2019 06:43    TPro  6.2    /  Alb  3.7    /  TBili  0.2    /  DBili  x      /  AST  23     /  ALT  41     /  AlkPhos  41     08 Aug 2019 06:43    PT/INR - ( 07 Aug 2019 07:11 )   PT: 11.3 sec;   INR: 0.98 ratio         PTT - ( 08 Aug 2019 06:43 )  PTT:109.9 sec    LIVER FUNCTIONS - ( 08 Aug 2019 06:43 )  Alb: 3.7 g/dL / Pro: 6.2 g/dL / ALK PHOS: 41 U/L / ALT: 41 U/L / AST: 23 U/L / GGT: x           --------------------------------------------------------------------------------------------------    MEDICATIONS  (STANDING):  amLODIPine   Tablet 5 milliGRAM(s) Oral daily  heparin  Infusion.  Unit(s)/Hr (17 mL/Hr) IV Continuous <Continuous>    MEDICATIONS  (PRN):  HYDROmorphone  Injectable 0.5 milliGRAM(s) IV Push every 10 minutes PRN Moderate Pain (4 - 6)  ondansetron Injectable 4 milliGRAM(s) IV Push once PRN Nausea and/or Vomiting    A/P: Patient is a 48y old female with occluded arterial flow in the right SFA and popliteal artery.    PLAN:  - Continue DVT ppx  - Continue excellent care per primary team    Vascular Surgery  [phone]

## 2019-08-08 NOTE — CHART NOTE - NSCHARTNOTEFT_GEN_A_CORE
Patient seen at bedside for check of right limb. Limb with warmth and perfusion of skin, weak dorsalis pedis and posterior tibialis pulses. Mild tenderness to palpation in the calf. Continued numbness in the toes, but sensation intact.

## 2019-08-08 NOTE — PROGRESS NOTE ADULT - SUBJECTIVE AND OBJECTIVE BOX
Authored by Dr Colten Hutton [phone] [facility]/ [id number] [facility]    Patient is a 48y old  Female who presents with a chief complaint of Right Leg Pain (07 Aug 2019 09:04)    SUBJECTIVE / OVERNIGHT EVENTS:    No acute events overnight.  Patient going to OR for angiogram and possible thrombectomy today.  No new complaints, R leg remains status quo (tingling/numbness in toes, slightly cool to touch).  No CP, SOB, N/V, swelling in extremities.    ROS: negative except for above    MEDICATIONS  (STANDING):    MEDICATIONS  (PRN):      Vital Signs Last 24 Hrs  T(C): 36.7 (08 Aug 2019 04:14), Max: 37 (07 Aug 2019 21:25)  T(F): 98 (08 Aug 2019 04:14), Max: 98.6 (07 Aug 2019 21:25)  HR: 51 (08 Aug 2019 04:14) (50 - 57)  BP: 114/79 (08 Aug 2019 04:14) (114/79 - 130/71)  BP(mean): --  RR: 18 (08 Aug 2019 04:14) (18 - 18)  SpO2: 97% (08 Aug 2019 04:14) (96% - 98%)  CAPILLARY BLOOD GLUCOSE        I&O's Summary    07 Aug 2019 07:01  -  08 Aug 2019 07:00  --------------------------------------------------------  IN: 360 mL / OUT: 0 mL / NET: 360 mL        PHYSICAL EXAM  GENERAL: NAD, well-developed  EYES: conjunctiva and sclera clear  NECK: Supple, No JVD  ENT: MMM  CHEST/LUNG: Clear to auscultation bilaterally; No wheeze  HEART: Regular rate and rhythm; No murmurs  ABDOMEN: Soft, Nontender, Nondistended; Bowel sounds present  EXTREMITIES: R lower extremity cool to touch, sensation and pulses intact;  LLE WNL  NEURO: nonfocal, AOx3  PSYCH: calm   SKIN: No rashes or lesions    LABS:                        12.8   6.3   )-----------( 358      ( 08 Aug 2019 06:43 )             39.7     08-08    138  |  104  |  9   ----------------------------<  98  4.0   |  22  |  0.77    Ca    9.2      08 Aug 2019 06:43  Phos  4.0     08-08  Mg     2.0     08-08    TPro  6.2  /  Alb  3.7  /  TBili  0.2  /  DBili  x   /  AST  23  /  ALT  41  /  AlkPhos  41  08-08    PT/INR - ( 07 Aug 2019 07:11 )   PT: 11.3 sec;   INR: 0.98 ratio         PTT - ( 08 Aug 2019 06:43 )  PTT:109.9 sec            RADIOLOGY & ADDITIONAL TESTS:    Imaging Personally Reviewed:  Consultant(s) Notes Reviewed:    Care Discussed with Consultants/Other Providers:

## 2019-08-08 NOTE — PROGRESS NOTE ADULT - ATTENDING COMMENTS
OR for angiogram and possible thrombectomy.  AC as per heme/vascular.  possible discharge tomorrow if cleared by consultants    Lee Reece MD, MHA, FACP, [facility]  Pager: [phone]

## 2019-08-09 LAB
ALBUMIN SERPL ELPH-MCNC: 3.8 G/DL — SIGNIFICANT CHANGE UP (ref 3.3–5)
ALP SERPL-CCNC: 44 U/L — SIGNIFICANT CHANGE UP (ref 40–120)
ALT FLD-CCNC: 46 U/L — HIGH (ref 10–45)
ANION GAP SERPL CALC-SCNC: 15 MMOL/L — SIGNIFICANT CHANGE UP (ref 5–17)
APTT BLD: 60.2 SEC — HIGH (ref 27.5–36.3)
AST SERPL-CCNC: 27 U/L — SIGNIFICANT CHANGE UP (ref 10–40)
BASOPHILS # BLD AUTO: 0.1 K/UL — SIGNIFICANT CHANGE UP (ref 0–0.2)
BASOPHILS NFR BLD AUTO: 2 % — SIGNIFICANT CHANGE UP (ref 0–2)
BILIRUB SERPL-MCNC: 0.3 MG/DL — SIGNIFICANT CHANGE UP (ref 0.2–1.2)
BLD GP AB SCN SERPL QL: NEGATIVE — SIGNIFICANT CHANGE UP
BUN SERPL-MCNC: 10 MG/DL — SIGNIFICANT CHANGE UP (ref 7–23)
CALCIUM SERPL-MCNC: 9.3 MG/DL — SIGNIFICANT CHANGE UP (ref 8.4–10.5)
CHLORIDE SERPL-SCNC: 102 MMOL/L — SIGNIFICANT CHANGE UP (ref 96–108)
CO2 SERPL-SCNC: 21 MMOL/L — LOW (ref 22–31)
CREAT SERPL-MCNC: 0.88 MG/DL — SIGNIFICANT CHANGE UP (ref 0.5–1.3)
EOSINOPHIL # BLD AUTO: 0.1 K/UL — SIGNIFICANT CHANGE UP (ref 0–0.5)
EOSINOPHIL NFR BLD AUTO: 1.4 % — SIGNIFICANT CHANGE UP (ref 0–6)
GLUCOSE SERPL-MCNC: 104 MG/DL — HIGH (ref 70–99)
HCT VFR BLD CALC: 38.2 % — SIGNIFICANT CHANGE UP (ref 34.5–45)
HGB BLD-MCNC: 12.6 G/DL — SIGNIFICANT CHANGE UP (ref 11.5–15.5)
LYMPHOCYTES # BLD AUTO: 1.2 K/UL — SIGNIFICANT CHANGE UP (ref 1–3.3)
LYMPHOCYTES # BLD AUTO: 19.7 % — SIGNIFICANT CHANGE UP (ref 13–44)
MAGNESIUM SERPL-MCNC: 2.1 MG/DL — SIGNIFICANT CHANGE UP (ref 1.6–2.6)
MCHC RBC-ENTMCNC: 27.1 PG — SIGNIFICANT CHANGE UP (ref 27–34)
MCHC RBC-ENTMCNC: 33.1 GM/DL — SIGNIFICANT CHANGE UP (ref 32–36)
MCV RBC AUTO: 81.9 FL — SIGNIFICANT CHANGE UP (ref 80–100)
MONOCYTES # BLD AUTO: 0.5 K/UL — SIGNIFICANT CHANGE UP (ref 0–0.9)
MONOCYTES NFR BLD AUTO: 8.4 % — SIGNIFICANT CHANGE UP (ref 2–14)
NEUTROPHILS # BLD AUTO: 4.2 K/UL — SIGNIFICANT CHANGE UP (ref 1.8–7.4)
NEUTROPHILS NFR BLD AUTO: 68.5 % — SIGNIFICANT CHANGE UP (ref 43–77)
PHOSPHATE SERPL-MCNC: 3.5 MG/DL — SIGNIFICANT CHANGE UP (ref 2.5–4.5)
PLATELET # BLD AUTO: 310 K/UL — SIGNIFICANT CHANGE UP (ref 150–400)
POTASSIUM SERPL-MCNC: 4 MMOL/L — SIGNIFICANT CHANGE UP (ref 3.5–5.3)
POTASSIUM SERPL-SCNC: 4 MMOL/L — SIGNIFICANT CHANGE UP (ref 3.5–5.3)
PROT SERPL-MCNC: 6.5 G/DL — SIGNIFICANT CHANGE UP (ref 6–8.3)
RBC # BLD: 4.67 M/UL — SIGNIFICANT CHANGE UP (ref 3.8–5.2)
RBC # FLD: 13.2 % — SIGNIFICANT CHANGE UP (ref 10.3–14.5)
RH IG SCN BLD-IMP: POSITIVE — SIGNIFICANT CHANGE UP
SODIUM SERPL-SCNC: 138 MMOL/L — SIGNIFICANT CHANGE UP (ref 135–145)
WBC # BLD: 6.1 K/UL — SIGNIFICANT CHANGE UP (ref 3.8–10.5)
WBC # FLD AUTO: 6.1 K/UL — SIGNIFICANT CHANGE UP (ref 3.8–10.5)

## 2019-08-09 PROCEDURE — 99233 SBSQ HOSP IP/OBS HIGH 50: CPT | Mod: GC

## 2019-08-09 RX ORDER — RIVAROXABAN 15 MG-20MG
1 KIT ORAL
Qty: 30 | Refills: 0
Start: 2019-08-09 | End: 2019-09-07

## 2019-08-09 RX ORDER — ENOXAPARIN SODIUM 100 MG/ML
140 INJECTION SUBCUTANEOUS DAILY
Refills: 0 | Status: DISCONTINUED | OUTPATIENT
Start: 2019-08-09 | End: 2019-08-10

## 2019-08-09 RX ORDER — AMLODIPINE BESYLATE 2.5 MG/1
5 TABLET ORAL DAILY
Refills: 0 | Status: DISCONTINUED | OUTPATIENT
Start: 2019-08-10 | End: 2019-08-10

## 2019-08-09 RX ADMIN — AMLODIPINE BESYLATE 5 MILLIGRAM(S): 2.5 TABLET ORAL at 05:32

## 2019-08-09 RX ADMIN — ENOXAPARIN SODIUM 140 MILLIGRAM(S): 100 INJECTION SUBCUTANEOUS at 17:50

## 2019-08-09 RX ADMIN — HEPARIN SODIUM 1700 UNIT(S)/HR: 5000 INJECTION INTRAVENOUS; SUBCUTANEOUS at 07:57

## 2019-08-09 NOTE — DIETITIAN INITIAL EVALUATION ADULT. - PROBLEM SELECTOR PLAN 2
Pt recently had an MRI that was negative for cancer in February, taking Tamoxifene 20 mg q Day  - Would hold Tamoxifen given that is it prothrombotic

## 2019-08-09 NOTE — DIETITIAN INITIAL EVALUATION ADULT. - PERTINENT LABORATORY DATA
08-09    138  |  102  |  10  ----------------------------<  104<H>  4.0   |  21<L>  |  0.88    Ca    9.3      09 Aug 2019 05:42  Phos  3.5     08-09  Mg     2.1     08-09

## 2019-08-09 NOTE — PROGRESS NOTE ADULT - ASSESSMENT
A/P:     Patient is a 48y old female with occluded arterial flow in the right SFA and popliteal artery, new renal mass suspected for new malignancy found on imaging:          - No surgical intervention at this time as inpatient   - Patient can follow up as outpatient with Dr. Fontenot 1-2 weeks following discharge   - Please have patient discharged on Xarelto, please refer to haematology for recommendations regarding stopping Xarelto for heme workup     Vascular Surgery  [phone]

## 2019-08-09 NOTE — PROGRESS NOTE ADULT - PROBLEM SELECTOR PLAN 1
Pt with a 4 mo hx of decreased perfusion and pain to the right leg, with ultrasound positive for occluded arterial flow in the right SFA and popliteal.   -holding tamoxifen per oncologist Dr. Blackmon  -angiography performed yesterday  - Heparin gtt held since this AM- plan for xarelto  - Heme/Onc consulted, will f/u OP after off anticoagulation for 2 weeks to re-eval hypercoag workup (which was negative inpatient except for heterozygous MTFR gene mutation which could predispose to clot) Pt with a 4 mo hx of decreased perfusion and pain to the right leg, with ultrasound positive for occluded arterial flow in the right SFA and popliteal.   -holding tamoxifen per oncologist Dr. Blackmon  -angiography performed yesterday  - Heparin gtt held since this AM- plan for a/c  - Heme/Onc consulted, will f/u OP after off anticoagulation for 2 weeks to re-eval hypercoag workup (which was negative inpatient except for heterozygous MTFR gene mutation which could predispose to clot)

## 2019-08-09 NOTE — PROGRESS NOTE ADULT - ASSESSMENT
49 yo female with a hx of DCIS s/p lumpectomy (currently on tamoxifen) and HTN who was admitted for an occluded arterial flow in the right SFA and popliteal artery, no procedure per vascular, plan for d/c on xarelto. 47 yo female with a hx of DCIS s/p lumpectomy (currently on tamoxifen) and HTN who was admitted for an occluded arterial flow in the right SFA and popliteal artery, no procedure per vascular, plan for d/c on a/c

## 2019-08-09 NOTE — PROGRESS NOTE ADULT - SUBJECTIVE AND OBJECTIVE BOX
Morro Chiang M.D.  Internal Medicine PGY-3  Pager: [phone]/ [facility] [phone]      Patient is a 48y old  Female who presents with a chief complaint of Right Leg Pain (08 Aug 2019 08:55)    SUBJECTIVE / OVERNIGHT EVENTS: Angiography performed last night, no intervention. Was taken to OR this AM however no procedure was performed as it was determined she is ambulatory with pulses and no need for surgical intervention.     On tele:    OBJECTIVE:  Vital Signs Last 24 Hrs  T(C): 37.4 (09 Aug 2019 10:50), Max: 37.4 (09 Aug 2019 04:52)  T(F): 99.4 (09 Aug 2019 04:52), Max: 99.4 (09 Aug 2019 04:52)  HR: 64 (09 Aug 2019 10:50) (51 - 74)  BP: 110/72 (09 Aug 2019 10:50) (110/72 - 148/82)  BP(mean): 100 (08 Aug 2019 16:00) (99 - 110)  RR: 18 (09 Aug 2019 10:50) (16 - 18)  SpO2: 98% (09 Aug 2019 10:50) (97% - 100%)    I&O's Summary    08 Aug 2019 07:01  -  09 Aug 2019 07:00  --------------------------------------------------------  IN: 308 mL / OUT: 1545 mL / NET: -1237 mL      Labs:  CAPILLARY BLOOD GLUCOSE    CBC Full  -  ( 09 Aug 2019 05:42 )  WBC Count : 6.1 K/uL  RBC Count : 4.67 M/uL  Hemoglobin : 12.6 g/dL  Hematocrit : 38.2 %  Platelet Count - Automated : 310 K/uL  Mean Cell Volume : 81.9 fl  Mean Cell Hemoglobin : 27.1 pg  Mean Cell Hemoglobin Concentration : 33.1 gm/dL  Auto Neutrophil # : 4.2 K/uL  Auto Lymphocyte # : 1.2 K/uL  Auto Monocyte # : 0.5 K/uL  Auto Eosinophil # : 0.1 K/uL  Auto Basophil # : 0.1 K/uL  Auto Neutrophil % : 68.5 %  Auto Lymphocyte % : 19.7 %  Auto Monocyte % : 8.4 %  Auto Eosinophil % : 1.4 %  Auto Basophil % : 2.0 %    08-09    138  |  102  |  10  ----------------------------<  104<H>  4.0   |  21<L>  |  0.88    Ca    9.3      09 Aug 2019 05:42  Phos  3.5     08-09  Mg     2.1     08-09    TPro  6.5  /  Alb  3.8  /  TBili  0.3  /  DBili  x   /  AST  27  /  ALT  46<H>  /  AlkPhos  44  08-09    PTT - ( 09 Aug 2019 05:42 )  PTT:60.2 sec      I&O's Summary    08 Aug 2019 07:01  -  09 Aug 2019 07:00  --------------------------------------------------------  IN: 308 mL / OUT: 1545 mL / NET: -1237 mL        Consultant(s) Notes Reviewed:   Care Discussed with Consultants/Other Providers:    MEDICATIONS  (STANDING):    MEDICATIONS  (PRN): Morro Chiang M.D.  Internal Medicine PGY-3  Pager: [phone]/ [facility] [phone]      Patient is a 48y old  Female who presents with a chief complaint of Right Leg Pain (08 Aug 2019 08:55)    SUBJECTIVE / OVERNIGHT EVENTS: Angiography performed last night, no intervention. Was taken to OR this AM however no procedure was performed as it was determined she is ambulatory with pulses and no need for surgical intervention.     On tele:    OBJECTIVE:  Vital Signs Last 24 Hrs  T(C): 37.4 (09 Aug 2019 10:50), Max: 37.4 (09 Aug 2019 04:52)  T(F): 99.4 (09 Aug 2019 04:52), Max: 99.4 (09 Aug 2019 04:52)  HR: 64 (09 Aug 2019 10:50) (51 - 74)  BP: 110/72 (09 Aug 2019 10:50) (110/72 - 148/82)  BP(mean): 100 (08 Aug 2019 16:00) (99 - 110)  RR: 18 (09 Aug 2019 10:50) (16 - 18)  SpO2: 98% (09 Aug 2019 10:50) (97% - 100%)    I&O's Summary    08 Aug 2019 07:01  -  09 Aug 2019 07:00  --------------------------------------------------------  IN: 308 mL / OUT: 1545 mL / NET: -1237 mL    Physical Exam:  General: AAOx3, NAD, resting comfortably  HEENT: NCAT  Respiratory: clear lungs  CV: NRR no murmurs  Abdomen: nontender, nondistended, soft  Extremities: no edema, warm well perfused, L palpable PT, R cannot palpate PT pulse  Groin site with dressing.   Skin: warm dry in tact      Labs:  CAPILLARY BLOOD GLUCOSE    CBC Full  -  ( 09 Aug 2019 05:42 )  WBC Count : 6.1 K/uL  RBC Count : 4.67 M/uL  Hemoglobin : 12.6 g/dL  Hematocrit : 38.2 %  Platelet Count - Automated : 310 K/uL  Mean Cell Volume : 81.9 fl  Mean Cell Hemoglobin : 27.1 pg  Mean Cell Hemoglobin Concentration : 33.1 gm/dL  Auto Neutrophil # : 4.2 K/uL  Auto Lymphocyte # : 1.2 K/uL  Auto Monocyte # : 0.5 K/uL  Auto Eosinophil # : 0.1 K/uL  Auto Basophil # : 0.1 K/uL  Auto Neutrophil % : 68.5 %  Auto Lymphocyte % : 19.7 %  Auto Monocyte % : 8.4 %  Auto Eosinophil % : 1.4 %  Auto Basophil % : 2.0 %    08-09    138  |  102  |  10  ----------------------------<  104<H>  4.0   |  21<L>  |  0.88    Ca    9.3      09 Aug 2019 05:42  Phos  3.5     08-09  Mg     2.1     08-09    TPro  6.5  /  Alb  3.8  /  TBili  0.3  /  DBili  x   /  AST  27  /  ALT  46<H>  /  AlkPhos  44  08-09    PTT - ( 09 Aug 2019 05:42 )  PTT:60.2 sec      I&O's Summary    08 Aug 2019 07:01  -  09 Aug 2019 07:00  --------------------------------------------------------  IN: 308 mL / OUT: 1545 mL / NET: -1237 mL        Consultant(s) Notes Reviewed:   Care Discussed with Consultants/Other Providers:    MEDICATIONS  (STANDING):    MEDICATIONS  (PRN):

## 2019-08-09 NOTE — DIETITIAN INITIAL EVALUATION ADULT. - OTHER INFO
Pt reported good appetite and po intake PTA. Followed a low sodium diet, ate 3 meals per day. Most lunches were takeout due to her job. She would cook at home for dinner and used minimum salt. Denied any recent wt changes, reported UBW 93.5 kg/206#, admit wt 93.4 kg/205#. Denied any chewing or swallowing difficulties, and any food allergies. Denied any nausea, vomiting, constipation, or diarrhea; tolerating diet. Noted with edema 1+ on right and left ankles. No pressure ulcers noted.

## 2019-08-09 NOTE — PRE-ANESTHESIA EVALUATION ADULT - NSANTHOSAYNRD_GEN_A_CORE
No. GERARDO screening performed.  STOP BANG Legend: 0-2 = LOW Risk; 3-4 = INTERMEDIATE Risk; 5-8 = HIGH Risk
No. GERARDO screening performed.  STOP BANG Legend: 0-2 = LOW Risk; 3-4 = INTERMEDIATE Risk; 5-8 = HIGH Risk

## 2019-08-09 NOTE — PROGRESS NOTE ADULT - SUBJECTIVE AND OBJECTIVE BOX
SUBJECTIVE:    No acute events overnight, VSS.       OBJECTIVE:     ** VITAL SIGNS / I&O's **    T(C): 36.9 (08-09-19 @ 13:27), Max: 37.4 (08-09-19 @ 04:52)  T(F): 98.4 (08-09-19 @ 13:27), Max: 99.4 (08-09-19 @ 04:52)  HR: 65 (08-09-19 @ 13:27) (52 - 74)  BP: 117/78 (08-09-19 @ 13:27) (110/72 - 134/77)  RR: 18 (08-09-19 @ 13:27) (16 - 18)  SpO2: 97% (08-09-19 @ 13:27) (97% - 99%)      08 Aug 2019 07:01  -  09 Aug 2019 07:00  --------------------------------------------------------  IN:    heparin  Infusion.: 68 mL    Oral Fluid: 240 mL  Total IN: 308 mL    OUT:    Indwelling Catheter - Urethral: 985 mL    Voided: 560 mL  Total OUT: 1545 mL    Total NET: -1237 mL          ** PHYSICAL EXAM **    Physical Exam:  General: NAD   Extremities: warm, stable from previous exam.       ** LABS **                 12.6   6.1    )----------(  310       ( 09 Aug 2019 05:42 )               38.2      138    |  102    |  10     ----------------------------<  104        ( 09 Aug 2019 05:42 )  4.0     |  21     |  0.88     Ca    9.3        ( 09 Aug 2019 05:42 )  Phos  3.5       ( 09 Aug 2019 05:42 )  Mg     2.1       ( 09 Aug 2019 05:42 )    TPro  6.5    /  Alb  3.8    /  TBili  0.3    /  DBili  x      /  AST  27     /  ALT  46     /  AlkPhos  44     ( 09 Aug 2019 05:42 )      PTT -  60.2 sec   ( 09 Aug 2019 05:42 )  CAPILLARY BLOOD GLUCOSE

## 2019-08-09 NOTE — DIETITIAN INITIAL EVALUATION ADULT. - PROBLEM SELECTOR PLAN 1
Pt with a 4 mo hx of decreased perfusion and pain to the right leg, with ultrasound positive for occluded arterial flow in the right SFA and popliteal. Of note, patient's cancer screenings are up to date and patient on Tamoxifen, which is prothrombic  - Vascular Surgery Consulted. Appreciate recs.  - CTA of the A/P with run off of lower extremities  - Heparin gtt  - ABIs  - Heme/Onc Consulted.  - Will order a hypercoaguable workup. However, in the setting of an acute clot, some tests may need to be repeated outpatient.  - Will get HCG, UA, UPC, and HIV to screen for acquired hypercoagulable states.

## 2019-08-09 NOTE — PROGRESS NOTE ADULT - PROBLEM SELECTOR PLAN 2
s 1.7 cm solid mass in right lower pole RCC until proven otherwise.   - Urology was consulted and recommended likely outpatient follow up for excision.

## 2019-08-09 NOTE — DIETITIAN INITIAL EVALUATION ADULT. - PROBLEM SELECTOR PLAN 4
Diet: DASH/TLD  DVT Prophylaxis:  Heparin gtt  Dispo: Home, pending resolution of symptoms    Bailey Maza  PGY-2 Internal Medicine  Pager: [phone] (NS)/[phone] ([facility])

## 2019-08-09 NOTE — PROGRESS NOTE ADULT - PROBLEM SELECTOR PLAN 5
Diet: DASH/TLC  DVT Prophylaxis:  Heparin gtt  Disposition: requires inpatient level of care.    Morro Chiang M.D.  Internal Medicine PGY-3  Pager: [phone]/ [facility] [phone]

## 2019-08-09 NOTE — DIETITIAN INITIAL EVALUATION ADULT. - PERTINENT MEDS FT
MEDICATIONS  (STANDING):  enoxaparin Injectable 140 milliGRAM(s) SubCutaneous daily    MEDICATIONS  (PRN):

## 2019-08-09 NOTE — PROGRESS NOTE ADULT - PROBLEM SELECTOR PLAN 3
Pt recently had an MRI that was negative for cancer in February, taking Tamoxifen 20 mg q Day, on hold per onc recs

## 2019-08-10 ENCOUNTER — TRANSCRIPTION ENCOUNTER (OUTPATIENT)
Age: 49
End: 2019-08-10

## 2019-08-10 VITALS
TEMPERATURE: 99 F | DIASTOLIC BLOOD PRESSURE: 79 MMHG | OXYGEN SATURATION: 97 % | SYSTOLIC BLOOD PRESSURE: 123 MMHG | RESPIRATION RATE: 18 BRPM | HEART RATE: 70 BPM

## 2019-08-10 LAB
APTT BLD: 30.7 SEC — SIGNIFICANT CHANGE UP (ref 27.5–36.3)
HCT VFR BLD CALC: 37.5 % — SIGNIFICANT CHANGE UP (ref 34.5–45)
HGB BLD-MCNC: 12.4 G/DL — SIGNIFICANT CHANGE UP (ref 11.5–15.5)
MCHC RBC-ENTMCNC: 27.1 PG — SIGNIFICANT CHANGE UP (ref 27–34)
MCHC RBC-ENTMCNC: 33.1 GM/DL — SIGNIFICANT CHANGE UP (ref 32–36)
MCV RBC AUTO: 81.7 FL — SIGNIFICANT CHANGE UP (ref 80–100)
PLATELET # BLD AUTO: 285 K/UL — SIGNIFICANT CHANGE UP (ref 150–400)
RBC # BLD: 4.59 M/UL — SIGNIFICANT CHANGE UP (ref 3.8–5.2)
RBC # FLD: 13.2 % — SIGNIFICANT CHANGE UP (ref 10.3–14.5)
WBC # BLD: 5.2 K/UL — SIGNIFICANT CHANGE UP (ref 3.8–10.5)
WBC # FLD AUTO: 5.2 K/UL — SIGNIFICANT CHANGE UP (ref 3.8–10.5)

## 2019-08-10 PROCEDURE — 81025 URINE PREGNANCY TEST: CPT

## 2019-08-10 PROCEDURE — 80053 COMPREHEN METABOLIC PANEL: CPT

## 2019-08-10 PROCEDURE — A9585: CPT

## 2019-08-10 PROCEDURE — 85613 RUSSELL VIPER VENOM DILUTED: CPT

## 2019-08-10 PROCEDURE — 86901 BLOOD TYPING SEROLOGIC RH(D): CPT

## 2019-08-10 PROCEDURE — 82435 ASSAY OF BLOOD CHLORIDE: CPT

## 2019-08-10 PROCEDURE — 81001 URINALYSIS AUTO W/SCOPE: CPT

## 2019-08-10 PROCEDURE — 74183 MRI ABD W/O CNTR FLWD CNTR: CPT

## 2019-08-10 PROCEDURE — 85652 RBC SED RATE AUTOMATED: CPT

## 2019-08-10 PROCEDURE — 85303 CLOT INHIBIT PROT C ACTIVITY: CPT

## 2019-08-10 PROCEDURE — 81240 F2 GENE: CPT

## 2019-08-10 PROCEDURE — 99239 HOSP IP/OBS DSCHRG MGMT >30: CPT | Mod: GC

## 2019-08-10 PROCEDURE — 81241 F5 GENE: CPT

## 2019-08-10 PROCEDURE — 84702 CHORIONIC GONADOTROPIN TEST: CPT

## 2019-08-10 PROCEDURE — 85014 HEMATOCRIT: CPT

## 2019-08-10 PROCEDURE — 85610 PROTHROMBIN TIME: CPT

## 2019-08-10 PROCEDURE — 81291 MTHFR GENE: CPT

## 2019-08-10 PROCEDURE — 83090 ASSAY OF HOMOCYSTEINE: CPT

## 2019-08-10 PROCEDURE — 82803 BLOOD GASES ANY COMBINATION: CPT

## 2019-08-10 PROCEDURE — 80048 BASIC METABOLIC PNL TOTAL CA: CPT

## 2019-08-10 PROCEDURE — 86850 RBC ANTIBODY SCREEN: CPT

## 2019-08-10 PROCEDURE — C1760: CPT

## 2019-08-10 PROCEDURE — 85730 THROMBOPLASTIN TIME PARTIAL: CPT

## 2019-08-10 PROCEDURE — 86140 C-REACTIVE PROTEIN: CPT

## 2019-08-10 PROCEDURE — 86147 CARDIOLIPIN ANTIBODY EA IG: CPT

## 2019-08-10 PROCEDURE — 93923 UPR/LXTR ART STDY 3+ LVLS: CPT

## 2019-08-10 PROCEDURE — 84295 ASSAY OF SERUM SODIUM: CPT

## 2019-08-10 PROCEDURE — 93306 TTE W/DOPPLER COMPLETE: CPT

## 2019-08-10 PROCEDURE — 85027 COMPLETE CBC AUTOMATED: CPT

## 2019-08-10 PROCEDURE — 99285 EMERGENCY DEPT VISIT HI MDM: CPT | Mod: 25

## 2019-08-10 PROCEDURE — 82947 ASSAY GLUCOSE BLOOD QUANT: CPT

## 2019-08-10 PROCEDURE — 85306 CLOT INHIBIT PROT S FREE: CPT

## 2019-08-10 PROCEDURE — 83605 ASSAY OF LACTIC ACID: CPT

## 2019-08-10 PROCEDURE — C1887: CPT

## 2019-08-10 PROCEDURE — 84132 ASSAY OF SERUM POTASSIUM: CPT

## 2019-08-10 PROCEDURE — 75635 CT ANGIO ABDOMINAL ARTERIES: CPT

## 2019-08-10 PROCEDURE — C1769: CPT

## 2019-08-10 PROCEDURE — 82330 ASSAY OF CALCIUM: CPT

## 2019-08-10 PROCEDURE — 84100 ASSAY OF PHOSPHORUS: CPT

## 2019-08-10 PROCEDURE — 86900 BLOOD TYPING SEROLOGIC ABO: CPT

## 2019-08-10 PROCEDURE — 86146 BETA-2 GLYCOPROTEIN ANTIBODY: CPT

## 2019-08-10 PROCEDURE — C1894: CPT

## 2019-08-10 PROCEDURE — 96374 THER/PROPH/DIAG INJ IV PUSH: CPT | Mod: XU

## 2019-08-10 PROCEDURE — 76000 FLUOROSCOPY <1 HR PHYS/QHP: CPT

## 2019-08-10 PROCEDURE — 83735 ASSAY OF MAGNESIUM: CPT

## 2019-08-10 PROCEDURE — 93005 ELECTROCARDIOGRAM TRACING: CPT

## 2019-08-10 PROCEDURE — 87389 HIV-1 AG W/HIV-1&-2 AB AG IA: CPT

## 2019-08-10 PROCEDURE — 93926 LOWER EXTREMITY STUDY: CPT

## 2019-08-10 PROCEDURE — 71045 X-RAY EXAM CHEST 1 VIEW: CPT

## 2019-08-10 PROCEDURE — 85300 ANTITHROMBIN III ACTIVITY: CPT

## 2019-08-10 RX ORDER — ENOXAPARIN SODIUM 100 MG/ML
90 INJECTION SUBCUTANEOUS
Qty: 5400 | Refills: 0
Start: 2019-08-10 | End: 2019-09-08

## 2019-08-10 RX ORDER — ENOXAPARIN SODIUM 100 MG/ML
90 INJECTION SUBCUTANEOUS
Qty: 5400 | Refills: 0
Start: 2019-08-10 | End: 2019-09-10

## 2019-08-10 RX ORDER — ENOXAPARIN SODIUM 100 MG/ML
90 INJECTION SUBCUTANEOUS ONCE
Refills: 0 | Status: COMPLETED | OUTPATIENT
Start: 2019-08-10 | End: 2019-08-10

## 2019-08-10 RX ADMIN — ENOXAPARIN SODIUM 90 MILLIGRAM(S): 100 INJECTION SUBCUTANEOUS at 10:29

## 2019-08-10 RX ADMIN — AMLODIPINE BESYLATE 5 MILLIGRAM(S): 2.5 TABLET ORAL at 05:55

## 2019-08-10 NOTE — DISCHARGE NOTE NURSING/CASE MANAGEMENT/SOCIAL WORK - NSDCFUADDAPPT_GEN_ALL_CORE_FT
-please see PCP 1-3 days following discharge  -please follow up with vascular surgery (Dr. Fontenot)  within 1 week.   -please follow up with your oncologist regarding the use of tamoxifen (hold for now), and to follow up imaging for the kidney mass.  -Please make follow up with urologist in 1-2 weeks to discuss management of the kidney mass ([phone])

## 2019-08-10 NOTE — PROGRESS NOTE ADULT - PROBLEM SELECTOR PLAN 1
Pt with a 4 mo hx of decreased perfusion and pain to the right leg, with ultrasound positive for occluded arterial flow in the right SFA and popliteal.   -holding tamoxifen per oncologist Dr. Blackmon  -angiography performed yesterday, no interventions  - therapeutic lovenox  - Heme/Onc consulted, will f/u OP after off anticoagulation for 2 weeks to re-eval hypercoag workup (which was negative inpatient except for heterozygous MTFR gene mutation which could predispose to clot)

## 2019-08-10 NOTE — PROGRESS NOTE ADULT - PROBLEM SELECTOR PROBLEM 1
Arterial occlusion

## 2019-08-10 NOTE — PROGRESS NOTE ADULT - SUBJECTIVE AND OBJECTIVE BOX
Patient is a 48y old  Female who presents with a chief complaint of Right Leg Pain (09 Aug 2019 15:30)      INTERVAL HPI/OVERNIGHT EVENTS:  No acute events overnight. This morning pt reports improved numbness of R leg. She denies R leg pain at rest - only has pain with ambulation. Pt denies CP, SOB, abdominal pain. Last BM was two days ago.     MEDICATIONS  (STANDING):  amLODIPine   Tablet 5 milliGRAM(s) Oral daily  enoxaparin Injectable 140 milliGRAM(s) SubCutaneous daily      Vital Signs Last 24 Hrs  T(C): 37.1 (10 Aug 2019 04:30), Max: 37.7 (09 Aug 2019 21:18)  T(F): 98.8 (10 Aug 2019 04:30), Max: 99.8 (09 Aug 2019 21:18)  HR: 60 (10 Aug 2019 04:30) (60 - 74)  BP: 116/74 (10 Aug 2019 04:30) (110/72 - 117/78)  RR: 18 (10 Aug 2019 04:30) (18 - 18)  SpO2: 96% (10 Aug 2019 04:30) (96% - 98%)    PHYSICAL EXAM:  GENERAL: NAD. Comfortable appearing  CHEST/LUNG: Clear to auscultation; No rales, rhonchi, or wheezing.  Respiratory effort does not appear labored.  HEART: Regular rate and rhythm; S1 and S2,  no murmurs, rubs, or gallops.  ABDOMEN: Soft, not tender to palpation.  No masses or HSM appreciated.  No distension.  Bowel sounds present.  EXTREMITIES:  LE warm to touch bilaterally. Weak Right DP and PT, sensation intact. Left DP and PT palpable.    SKIN: No obvious rashes or lesions.  Turgor okay.  NEURO:  Alert and oriented x 3, no focal sensory or  motor deficit    LABS:                        12.6   6.1   )-----------( 310      ( 09 Aug 2019 05:42 )             38.2     08-09    138  |  102  |  10  ----------------------------<  104<H>  4.0   |  21<L>  |  0.88    Ca    9.3      09 Aug 2019 05:42  Phos  3.5     08-09  Mg     2.1     08-09    TPro  6.5  /  Alb  3.8  /  TBili  0.3  /  DBili  x   /  AST  27  /  ALT  46<H>  /  AlkPhos  44  08-09    PTT - ( 10 Aug 2019 07:26 )  PTT:30.7 sec    CAPILLARY BLOOD GLUCOSE Patient is a 48y old  Female who presents with a chief complaint of Right Leg Pain (09 Aug 2019 15:30)      INTERVAL HPI/OVERNIGHT EVENTS:  No acute events overnight. This morning pt reports improved numbness of R leg. She denies R leg pain at rest - only has pain with ambulation. Pt denies CP, SOB, abdominal pain. Last BM was two days ago.     MEDICATIONS  (STANDING):  amLODIPine   Tablet 5 milliGRAM(s) Oral daily  enoxaparin Injectable 140 milliGRAM(s) SubCutaneous daily      Vital Signs Last 24 Hrs  T(C): 37.1 (10 Aug 2019 04:30), Max: 37.7 (09 Aug 2019 21:18)  T(F): 98.8 (10 Aug 2019 04:30), Max: 99.8 (09 Aug 2019 21:18)  HR: 60 (10 Aug 2019 04:30) (60 - 74)  BP: 116/74 (10 Aug 2019 04:30) (110/72 - 117/78)  RR: 18 (10 Aug 2019 04:30) (18 - 18)  SpO2: 96% (10 Aug 2019 04:30) (96% - 98%)    PHYSICAL EXAM:  GENERAL: NAD. Comfortable appearing  CHEST/LUNG: Clear to auscultation; No rales, rhonchi, or wheezing.  Respiratory effort does not appear labored.  HEART: Regular rate and rhythm; S1 and S2,  no murmurs, rubs, or gallops.  ABDOMEN: Soft, not tender to palpation.  No masses or HSM appreciated.  No distension.  Bowel sounds present.  EXTREMITIES:  LE warm to touch bilaterally. Weak Right DP and PT, sensation intact. Left DP and PT palpable.    SKIN: No obvious rashes or lesions.  Turgor okay.  NEURO:  Alert and oriented x 3, no focal sensory or  motor deficit    LABS:                        12.4   5.2   )-----------( 285      ( 10 Aug 2019 07:26 )             37.5                   PTT - ( 10 Aug 2019 07:26 )  PTT:30.7 sec Patient is a 48y old  Female who presents with a chief complaint of Right Leg Pain (09 Aug 2019 15:30)      INTERVAL HPI/OVERNIGHT EVENTS:  No acute events overnight. This morning pt reports improved numbness of R leg. She denies R leg pain at rest - only has pain with ambulation. Pt denies CP, SOB, abdominal pain. Last BM was two days ago.     MEDICATIONS  (STANDING):  amLODIPine   Tablet 5 milliGRAM(s) Oral daily  enoxaparin Injectable 140 milliGRAM(s) SubCutaneous daily      Vital Signs Last 24 Hrs  T(C): 37.1 (10 Aug 2019 04:30), Max: 37.7 (09 Aug 2019 21:18)  T(F): 98.8 (10 Aug 2019 04:30), Max: 99.8 (09 Aug 2019 21:18)  HR: 60 (10 Aug 2019 04:30) (60 - 74)  BP: 116/74 (10 Aug 2019 04:30) (110/72 - 117/78)  RR: 18 (10 Aug 2019 04:30) (18 - 18)  SpO2: 96% (10 Aug 2019 04:30) (96% - 98%)    PHYSICAL EXAM:  GENERAL: NAD. Comfortable appearing  CHEST/LUNG: Clear to auscultation; No rales, rhonchi, or wheezing.  Respiratory effort does not appear labored.  HEART: Regular rate and rhythm; S1 and S2,  no murmurs, rubs, or gallops.  ABDOMEN: Soft, not tender to palpation.  No masses or HSM appreciated.  No distension.  Bowel sounds present.  EXTREMITIES:  Left groin hematoma, tender to palpation. LE warm to touch bilaterally. Weak DP and PT bilaterally, sensation intact.  SKIN: No obvious rashes or lesions.  Turgor okay.  NEURO:  Alert and oriented x 3, no focal sensory or  motor deficit    LABS:                        12.4   5.2   )-----------( 285      ( 10 Aug 2019 07:26 )             37.5                   PTT - ( 10 Aug 2019 07:26 )  PTT:30.7 sec

## 2019-08-10 NOTE — PROGRESS NOTE ADULT - REASON FOR ADMISSION
Right Leg Pain

## 2019-08-10 NOTE — DISCHARGE NOTE NURSING/CASE MANAGEMENT/SOCIAL WORK - NSDCDPATPORTLINK_GEN_ALL_CORE
You can access the [email or web address] Patient Portal, offered by [facility], by registering with the following website: [email or web address]

## 2019-08-10 NOTE — PROGRESS NOTE ADULT - PROVIDER SPECIALTY LIST ADULT
Heme/Onc
Internal Medicine
Vascular Surgery
Internal Medicine
Internal Medicine

## 2019-08-10 NOTE — PROGRESS NOTE ADULT - ATTENDING COMMENTS
Patient dispo today, assessed by vascular this morning as small compressible hematoma, ok by vascular for discharge,. Discharge planning  40 minutes

## 2019-08-10 NOTE — PROGRESS NOTE ADULT - PROBLEM SELECTOR PLAN 5
Diet: DASH/TLC  DVT Prophylaxis:  Heparin gtt  Disposition: requires inpatient level of care.    Morro Chiang M.D.  Internal Medicine PGY-3  Pager: [phone]/ [facility] [phone] Diet: DASH/TLC  DVT Prophylaxis:  Heparin gtt  Disposition: requires inpatient level of care. Diet: DASH/TLC  DVT Prophylaxis:  Lovenox  Disposition: requires inpatient level of care.

## 2019-08-10 NOTE — PROGRESS NOTE ADULT - ASSESSMENT
49 yo female with a hx of DCIS s/p lumpectomy (currently on tamoxifen) and HTN who was admitted for an occluded arterial flow in the right SFA and popliteal artery, no procedure per vascular, plan for d/c on a/c

## 2019-08-13 ENCOUNTER — INBOUND DOCUMENT (OUTPATIENT)
Age: 49
End: 2019-08-13

## 2019-08-15 DIAGNOSIS — Z71.89 OTHER SPECIFIED COUNSELING: ICD-10-CM

## 2019-08-22 ENCOUNTER — INBOUND DOCUMENT (OUTPATIENT)
Age: 49
End: 2019-08-22

## 2019-08-26 ENCOUNTER — APPOINTMENT (OUTPATIENT)
Dept: UROLOGY | Facility: CLINIC | Age: 49
End: 2019-08-26
Payer: MEDICAID

## 2019-08-26 VITALS
HEIGHT: 66 IN | HEART RATE: 73 BPM | DIASTOLIC BLOOD PRESSURE: 36 MMHG | WEIGHT: 205 LBS | SYSTOLIC BLOOD PRESSURE: 137 MMHG | BODY MASS INDEX: 32.95 KG/M2

## 2019-08-26 DIAGNOSIS — Z98.86 PERSONAL HISTORY OF BREAST IMPLANT REMOVAL: ICD-10-CM

## 2019-08-26 PROCEDURE — 99204 OFFICE O/P NEW MOD 45 MIN: CPT

## 2019-08-26 RX ORDER — CHOLECALCIFEROL (VITAMIN D3) 25 MCG
TABLET ORAL
Refills: 0 | Status: ACTIVE | COMMUNITY

## 2019-08-26 RX ORDER — AMLODIPINE BESYLATE 5 MG/1
5 TABLET ORAL
Refills: 0 | Status: ACTIVE | COMMUNITY

## 2019-08-26 RX ORDER — ACETAMINOPHEN 500 MG
1000 TABLET ORAL
Refills: 0 | Status: ACTIVE | COMMUNITY

## 2019-08-26 NOTE — REVIEW OF SYSTEMS
[Feeling Tired] : feeling tired [Heart Rate Is Fast] : fast heart rate [see HPI] : see HPI [both] : pain during and after intercourse [denies] : denies pain with orgasm [Date of last menstrual period ____] : date of last menstrual period: [unfilled] [Negative] : Heme/Lymph

## 2019-08-26 NOTE — PHYSICAL EXAM
[General Appearance - Well Nourished] : well nourished [General Appearance - Well Developed] : well developed [Normal Appearance] : normal appearance [Well Groomed] : well groomed [General Appearance - In No Acute Distress] : no acute distress [Edema] : no peripheral edema [Exaggerated Use Of Accessory Muscles For Inspiration] : no accessory muscle use [Respiration, Rhythm And Depth] : normal respiratory rhythm and effort [Abdomen Soft] : soft [Abdomen Tenderness] : non-tender [Costovertebral Angle Tenderness] : no ~M costovertebral angle tenderness [Urinary Bladder Findings] : the bladder was normal on palpation [] : no rash [No Focal Deficits] : no focal deficits [Oriented To Time, Place, And Person] : oriented to person, place, and time [Affect] : the affect was normal [Mood] : the mood was normal [Not Anxious] : not anxious [No Palpable Adenopathy] : no palpable adenopathy [FreeTextEntry1] : ambulates with cane

## 2019-08-26 NOTE — HISTORY OF PRESENT ILLNESS
[FreeTextEntry1] : 47 yo F with history of breast cancer, in remission\par Recently presented to the ER for right leg pain and found to have arterial thrombus which appeared to be chronic on angiogram\par Incidental finding of right renal mass\par Denies any flank pain or gross hematuria

## 2019-08-26 NOTE — ASSESSMENT
[FreeTextEntry1] : 47 yo F with right renal mass\par \par - Reviewed CT and MRI imaging\par - Discussed options including the pros and cons of active surveillance vs percutaneous ablation vs surgical extirpation. Discussed all risks and benefits and pt interested in perc ablation. Given size and location, likely amenable to this but will need IR consult. Reviewed risks including bleeding, infection, oncologic efficacy and possibility of needing repeat procedure in the future\par - Refer to IR for consult\par

## 2019-09-17 ENCOUNTER — APPOINTMENT (OUTPATIENT)
Dept: INTERVENTIONAL RADIOLOGY/VASCULAR | Facility: CLINIC | Age: 49
End: 2019-09-17
Payer: MEDICAID

## 2019-09-17 VITALS
OXYGEN SATURATION: 98 % | HEIGHT: 66 IN | RESPIRATION RATE: 16 BRPM | DIASTOLIC BLOOD PRESSURE: 92 MMHG | BODY MASS INDEX: 32.62 KG/M2 | SYSTOLIC BLOOD PRESSURE: 125 MMHG | HEART RATE: 74 BPM | WEIGHT: 203 LBS

## 2019-09-17 DIAGNOSIS — Z86.79 PERSONAL HISTORY OF OTHER DISEASES OF THE CIRCULATORY SYSTEM: ICD-10-CM

## 2019-09-17 DIAGNOSIS — Z80.3 FAMILY HISTORY OF MALIGNANT NEOPLASM OF BREAST: ICD-10-CM

## 2019-09-17 PROCEDURE — 99205 OFFICE O/P NEW HI 60 MIN: CPT

## 2019-09-17 PROCEDURE — 99204 OFFICE O/P NEW MOD 45 MIN: CPT

## 2019-09-17 RX ORDER — ENOXAPARIN SODIUM 100 MG/ML
100 INJECTION SUBCUTANEOUS
Refills: 0 | Status: ACTIVE | COMMUNITY

## 2019-09-20 ENCOUNTER — OUTPATIENT (OUTPATIENT)
Dept: OUTPATIENT SERVICES | Facility: HOSPITAL | Age: 49
LOS: 1 days | End: 2019-09-20
Payer: MEDICAID

## 2019-09-20 VITALS
HEIGHT: 66 IN | OXYGEN SATURATION: 98 % | HEART RATE: 72 BPM | DIASTOLIC BLOOD PRESSURE: 78 MMHG | TEMPERATURE: 97 F | WEIGHT: 210.1 LBS | RESPIRATION RATE: 14 BRPM | SYSTOLIC BLOOD PRESSURE: 120 MMHG

## 2019-09-20 DIAGNOSIS — Z90.49 ACQUIRED ABSENCE OF OTHER SPECIFIED PARTS OF DIGESTIVE TRACT: Chronic | ICD-10-CM

## 2019-09-20 DIAGNOSIS — Z98.890 OTHER SPECIFIED POSTPROCEDURAL STATES: Chronic | ICD-10-CM

## 2019-09-20 DIAGNOSIS — Z98.891 HISTORY OF UTERINE SCAR FROM PREVIOUS SURGERY: Chronic | ICD-10-CM

## 2019-09-20 DIAGNOSIS — R89.7 ABNORMAL HISTOLOGICAL FINDINGS IN SPECIMENS FROM OTHER ORGANS, SYSTEMS AND TISSUES: Chronic | ICD-10-CM

## 2019-09-20 DIAGNOSIS — N28.89 OTHER SPECIFIED DISORDERS OF KIDNEY AND URETER: ICD-10-CM

## 2019-09-20 LAB
ANION GAP SERPL CALC-SCNC: 15 MMO/L — HIGH (ref 7–14)
BUN SERPL-MCNC: 9 MG/DL — SIGNIFICANT CHANGE UP (ref 7–23)
CALCIUM SERPL-MCNC: 9.9 MG/DL — SIGNIFICANT CHANGE UP (ref 8.4–10.5)
CHLORIDE SERPL-SCNC: 101 MMOL/L — SIGNIFICANT CHANGE UP (ref 98–107)
CO2 SERPL-SCNC: 25 MMOL/L — SIGNIFICANT CHANGE UP (ref 22–31)
CREAT SERPL-MCNC: 0.7 MG/DL — SIGNIFICANT CHANGE UP (ref 0.5–1.3)
GLUCOSE SERPL-MCNC: 71 MG/DL — SIGNIFICANT CHANGE UP (ref 70–99)
HCT VFR BLD CALC: 43.6 % — SIGNIFICANT CHANGE UP (ref 34.5–45)
HGB BLD-MCNC: 13.6 G/DL — SIGNIFICANT CHANGE UP (ref 11.5–15.5)
MAGNESIUM SERPL-MCNC: 1.9 MG/DL — SIGNIFICANT CHANGE UP (ref 1.6–2.6)
MCHC RBC-ENTMCNC: 26.4 PG — LOW (ref 27–34)
MCHC RBC-ENTMCNC: 31.2 % — LOW (ref 32–36)
MCV RBC AUTO: 84.7 FL — SIGNIFICANT CHANGE UP (ref 80–100)
NRBC # FLD: 0 K/UL — SIGNIFICANT CHANGE UP (ref 0–0)
PHOSPHATE SERPL-MCNC: 3.3 MG/DL — SIGNIFICANT CHANGE UP (ref 2.5–4.5)
PLATELET # BLD AUTO: 434 K/UL — HIGH (ref 150–400)
PMV BLD: 9.6 FL — SIGNIFICANT CHANGE UP (ref 7–13)
POTASSIUM SERPL-MCNC: 3.7 MMOL/L — SIGNIFICANT CHANGE UP (ref 3.5–5.3)
POTASSIUM SERPL-SCNC: 3.7 MMOL/L — SIGNIFICANT CHANGE UP (ref 3.5–5.3)
RBC # BLD: 5.15 M/UL — SIGNIFICANT CHANGE UP (ref 3.8–5.2)
RBC # FLD: 14.5 % — SIGNIFICANT CHANGE UP (ref 10.3–14.5)
SODIUM SERPL-SCNC: 141 MMOL/L — SIGNIFICANT CHANGE UP (ref 135–145)
WBC # BLD: 6.76 K/UL — SIGNIFICANT CHANGE UP (ref 3.8–10.5)
WBC # FLD AUTO: 6.76 K/UL — SIGNIFICANT CHANGE UP (ref 3.8–10.5)

## 2019-09-20 PROCEDURE — 93010 ELECTROCARDIOGRAM REPORT: CPT

## 2019-09-20 RX ORDER — AMLODIPINE BESYLATE 2.5 MG/1
1 TABLET ORAL
Qty: 0 | Refills: 0 | DISCHARGE

## 2019-09-20 RX ORDER — FERROUS SULFATE 325(65) MG
1 TABLET ORAL
Qty: 0 | Refills: 0 | DISCHARGE
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
## 2019-09-20 NOTE — H&P PST ADULT - NEGATIVE GENERAL SYMPTOMS
no polydipsia/no fever/no anorexia/no weight loss/no fatigue/no weight gain/no polyphagia/no polyuria/no malaise/no chills/no sweating

## 2019-09-20 NOTE — H&P PST ADULT - NSANTHOSAYNRD_GEN_A_CORE
never tested/No. GERARDO screening performed.  STOP BANG Legend: 0-2 = LOW Risk; 3-4 = INTERMEDIATE Risk; 5-8 = HIGH Risk

## 2019-09-20 NOTE — H&P PST ADULT - NSICDXPROBLEM_GEN_ALL_CORE_FT
PROBLEM DIAGNOSES  Problem: Renal mass  Assessment and Plan:       R/O PROBLEM DIAGNOSES  Problem: DVT, lower extremity  Assessment and Plan: PROBLEM DIAGNOSES  Problem: Renal mass  Assessment and Plan:   Pt scheduled to have CT guided right renal mass biopsy and ablation on 10/2/19,  labs pending, EKG in chart. Preop instructions provided to pt.   Famotidine and chlorhexidine scrubs provided to pt with instructions..           R/O PROBLEM DIAGNOSES  Problem: DVT, lower extremity  Assessment and Plan:  Pt on Lovenox for her DVT of right leg,   Pt advised to obtain clearance from her oncologist who manages her DVT.   Pt was advised by Dr. Earl to stop Lovenox 24 hrs prior to surgery.   Pt advised to stop Lovenox on 9/30/19 and consult with her Oncologist regarding the same during her clearance - pt verbalizes understand.

## 2019-09-20 NOTE — H&P PST ADULT - NSICDXPASTMEDICALHX_GEN_ALL_CORE_FT
PAST MEDICAL HISTORY:  DVT, lower extremity right leg and on lovenox    Essential hypertension     Malignant neoplasm of left female breast, unspecified estrogen receptor status, unspecified site of breast     Obesity     Renal mass right

## 2019-09-20 NOTE — H&P PST ADULT - HISTORY OF PRESENT ILLNESS
47 y/o female with PMH of hypertension, obesity, malignant neoplasm of left breast and had left breast lumpectomy with preoperative needle localization and axillary sentinel node biopsy on 8/22/2018. Pt had DVT left leg  and was in hospital [facility] in July 2019. Pt is currently on Lovenox. During her hospitalization a ? mass was noted on her right kidney. Pt presents for preop eval to have CT guided right renal mass biopsy and ablation on 10/2/19,

## 2019-09-20 NOTE — H&P PST ADULT - ASSESSMENT
47 y/o female presents for preop eval to have CT guided right renal mass biopsy and ablation on 10/2/19,

## 2019-09-20 NOTE — H&P PST ADULT - RS GEN PE MLT RESP DETAILS PC
no intercostal retractions/breath sounds equal/respirations non-labored/no rales/no rhonchi/no wheezes/good air movement/airway patent/clear to auscultation bilaterally/no chest wall tenderness/normal/no subcutaneous emphysema

## 2019-09-20 NOTE — H&P PST ADULT - NEGATIVE CARDIOVASCULAR SYMPTOMS
no chest pain/no palpitations/no orthopnea/no dyspnea on exertion/no claudication/no paroxysmal nocturnal dyspnea/no peripheral edema

## 2019-10-01 ENCOUNTER — FORM ENCOUNTER (OUTPATIENT)
Age: 49
End: 2019-10-01

## 2019-10-01 PROBLEM — I82.409 ACUTE EMBOLISM AND THROMBOSIS OF UNSPECIFIED DEEP VEINS OF UNSPECIFIED LOWER EXTREMITY: Chronic | Status: ACTIVE | Noted: 2019-09-20

## 2019-10-01 PROBLEM — N28.89 OTHER SPECIFIED DISORDERS OF KIDNEY AND URETER: Chronic | Status: ACTIVE | Noted: 2019-09-20

## 2019-10-01 PROBLEM — E66.9 OBESITY, UNSPECIFIED: Chronic | Status: ACTIVE | Noted: 2019-09-20

## 2019-10-02 ENCOUNTER — RESULT REVIEW (OUTPATIENT)
Age: 49
End: 2019-10-02

## 2019-10-02 ENCOUNTER — OUTPATIENT (OUTPATIENT)
Dept: OUTPATIENT SERVICES | Facility: HOSPITAL | Age: 49
LOS: 1 days | End: 2019-10-02
Payer: MEDICAID

## 2019-10-02 DIAGNOSIS — Z90.49 ACQUIRED ABSENCE OF OTHER SPECIFIED PARTS OF DIGESTIVE TRACT: Chronic | ICD-10-CM

## 2019-10-02 DIAGNOSIS — N28.89 OTHER SPECIFIED DISORDERS OF KIDNEY AND URETER: ICD-10-CM

## 2019-10-02 DIAGNOSIS — R89.7 ABNORMAL HISTOLOGICAL FINDINGS IN SPECIMENS FROM OTHER ORGANS, SYSTEMS AND TISSUES: Chronic | ICD-10-CM

## 2019-10-02 DIAGNOSIS — Z98.890 OTHER SPECIFIED POSTPROCEDURAL STATES: Chronic | ICD-10-CM

## 2019-10-02 DIAGNOSIS — Z98.891 HISTORY OF UTERINE SCAR FROM PREVIOUS SURGERY: Chronic | ICD-10-CM

## 2019-10-02 LAB
HCG UR-SCNC: NEGATIVE — SIGNIFICANT CHANGE UP
SP GR UR: 1.02 — SIGNIFICANT CHANGE UP (ref 1–1.03)

## 2019-10-02 PROCEDURE — 50592 PERC RF ABLATE RENAL TUMOR: CPT | Mod: RT

## 2019-10-02 PROCEDURE — 50200 RENAL BIOPSY PERQ: CPT | Mod: RT

## 2019-10-02 PROCEDURE — 77013 CT GUIDE FOR TISSUE ABLATION: CPT | Mod: 26

## 2019-10-02 PROCEDURE — 88341 IMHCHEM/IMCYTCHM EA ADD ANTB: CPT | Mod: 26

## 2019-10-02 PROCEDURE — 88173 CYTOPATH EVAL FNA REPORT: CPT | Mod: 26

## 2019-10-02 PROCEDURE — 77012 CT SCAN FOR NEEDLE BIOPSY: CPT | Mod: 26,59

## 2019-10-02 PROCEDURE — 88342 IMHCHEM/IMCYTCHM 1ST ANTB: CPT | Mod: 26

## 2019-10-02 PROCEDURE — 88305 TISSUE EXAM BY PATHOLOGIST: CPT | Mod: 26

## 2019-10-07 DIAGNOSIS — N28.89 OTHER SPECIFIED DISORDERS OF KIDNEY AND URETER: ICD-10-CM

## 2019-10-22 ENCOUNTER — APPOINTMENT (OUTPATIENT)
Dept: INTERVENTIONAL RADIOLOGY/VASCULAR | Facility: CLINIC | Age: 49
End: 2019-10-22
Payer: MEDICAID

## 2019-10-22 VITALS
SYSTOLIC BLOOD PRESSURE: 147 MMHG | DIASTOLIC BLOOD PRESSURE: 97 MMHG | BODY MASS INDEX: 32.62 KG/M2 | WEIGHT: 203 LBS | RESPIRATION RATE: 18 BRPM | HEIGHT: 66 IN | OXYGEN SATURATION: 100 % | HEART RATE: 66 BPM

## 2019-10-22 PROCEDURE — 99214 OFFICE O/P EST MOD 30 MIN: CPT

## 2019-12-17 ENCOUNTER — FORM ENCOUNTER (OUTPATIENT)
Age: 49
End: 2019-12-17

## 2019-12-18 ENCOUNTER — OUTPATIENT (OUTPATIENT)
Dept: OUTPATIENT SERVICES | Facility: HOSPITAL | Age: 49
LOS: 1 days | End: 2019-12-18
Payer: MEDICAID

## 2019-12-18 ENCOUNTER — APPOINTMENT (OUTPATIENT)
Dept: MRI IMAGING | Facility: IMAGING CENTER | Age: 49
End: 2019-12-18
Payer: MEDICAID

## 2019-12-18 DIAGNOSIS — Z98.890 OTHER SPECIFIED POSTPROCEDURAL STATES: Chronic | ICD-10-CM

## 2019-12-18 DIAGNOSIS — R89.7 ABNORMAL HISTOLOGICAL FINDINGS IN SPECIMENS FROM OTHER ORGANS, SYSTEMS AND TISSUES: Chronic | ICD-10-CM

## 2019-12-18 DIAGNOSIS — Z98.891 HISTORY OF UTERINE SCAR FROM PREVIOUS SURGERY: Chronic | ICD-10-CM

## 2019-12-18 DIAGNOSIS — N28.89 OTHER SPECIFIED DISORDERS OF KIDNEY AND URETER: ICD-10-CM

## 2019-12-18 DIAGNOSIS — Z90.49 ACQUIRED ABSENCE OF OTHER SPECIFIED PARTS OF DIGESTIVE TRACT: Chronic | ICD-10-CM

## 2019-12-18 PROCEDURE — 74183 MRI ABD W/O CNTR FLWD CNTR: CPT | Mod: 26

## 2019-12-18 PROCEDURE — 74183 MRI ABD W/O CNTR FLWD CNTR: CPT

## 2019-12-18 PROCEDURE — A9585: CPT

## 2020-01-13 ENCOUNTER — OUTPATIENT (OUTPATIENT)
Dept: OUTPATIENT SERVICES | Facility: HOSPITAL | Age: 50
LOS: 1 days | End: 2020-01-13
Payer: COMMERCIAL

## 2020-01-13 DIAGNOSIS — I74.9 EMBOLISM AND THROMBOSIS OF UNSPECIFIED ARTERY: ICD-10-CM

## 2020-01-13 DIAGNOSIS — R89.7 ABNORMAL HISTOLOGICAL FINDINGS IN SPECIMENS FROM OTHER ORGANS, SYSTEMS AND TISSUES: Chronic | ICD-10-CM

## 2020-01-13 DIAGNOSIS — Z98.890 OTHER SPECIFIED POSTPROCEDURAL STATES: Chronic | ICD-10-CM

## 2020-01-13 DIAGNOSIS — Z90.49 ACQUIRED ABSENCE OF OTHER SPECIFIED PARTS OF DIGESTIVE TRACT: Chronic | ICD-10-CM

## 2020-01-13 DIAGNOSIS — Z98.891 HISTORY OF UTERINE SCAR FROM PREVIOUS SURGERY: Chronic | ICD-10-CM

## 2020-01-13 PROCEDURE — 93306 TTE W/DOPPLER COMPLETE: CPT

## 2020-01-14 RX ORDER — LORATADINE 10 MG/1
0 TABLET ORAL
Qty: 0 | Refills: 0 | DISCHARGE

## 2020-08-06 NOTE — ED ADULT NURSE NOTE - ED STAT RN HANDOFF DETAILS
08/05/2020  Brianna Coughlin is a 57 y.o., female.    Past Medical History:   Diagnosis Date    Abnormal Pap smear     Anxiety     Breast cyst     Dysplasia of cervix     Fibroids     GERD (gastroesophageal reflux disease)     Hypertension     Ovarian cyst      Past Surgical History:   Procedure Laterality Date    CHOLECYSTECTOMY      conization of cervix      TUBAL LIGATION           Anesthesia Evaluation    I have reviewed the Patient Summary Reports.    I have reviewed the Nursing Notes. I have reviewed the NPO Status.   I have reviewed the Medications.     Review of Systems  Anesthesia Hx:  Hx of Anesthetic complications PONV History of prior surgery of interest to airway management or planning:   Social:  Non-Smoker, No Alcohol Use    Hematology/Oncology:  Hematology Normal   Oncology Normal     EENT/Dental:EENT/Dental Normal   Cardiovascular:   Exercise tolerance: good Hypertension ECG has been reviewed. Echo 3/16:  CONCLUSIONS     1 - Normal left ventricular systolic function (EF 55-60%).     2 - Trivial mitral regurgitation.     3 - Trivial tricuspid regurgitation.    Pulmonary:  Pulmonary Normal    Renal/:  Renal/ Normal     Hepatic/GI:   GERD    Musculoskeletal:  Musculoskeletal Normal    Neurological:   Neuromuscular Disease, Headaches    Endocrine:  Endocrine Normal    Dermatological:  Skin Normal    Psych:   anxiety depression          Physical Exam  General:  Well nourished    Airway/Jaw/Neck:  Airway Findings: Mouth Opening: Normal Tongue: Normal  Mallampati: II  TM Distance: Normal, at least 6 cm  Jaw/Neck Findings:     Neck ROM: Normal ROM      Dental:  Dental Findings: In tact   Chest/Lungs:  Chest/Lungs Findings: Clear to auscultation, Normal Respiratory Rate     Heart/Vascular:  Heart Findings: Rate: Normal  Rhythm: Regular Rhythm  Sounds: Normal        Mental  Status:  Mental Status Findings:  Cooperative, Alert and Oriented         Anesthesia Plan  Type of Anesthesia, risks & benefits discussed:  Anesthesia Type:  general, MAC  Patient's Preference:   Intra-op Monitoring Plan: standard ASA monitors  Intra-op Monitoring Plan Comments:   Post Op Pain Control Plan:   Post Op Pain Control Plan Comments:   Induction:   IV  Beta Blocker:  Patient is not currently on a Beta-Blocker (No further documentation required).       Informed Consent: Patient understands risks and agrees with Anesthesia plan.  Questions answered. Anesthesia consent signed with patient.  ASA Score: 2     Day of Surgery Review of History & Physical: I have interviewed and examined the patient. I have reviewed the patient's H&P dated:  There are no significant changes.          Ready For Surgery From Anesthesia Perspective.        Report given to Lilo Ponce RN

## 2020-08-24 NOTE — PROGRESS NOTE ADULT - ATTENDING COMMENTS
Consult- Fernando Rodríguez 1990, 34 y o  female MRN: [id number]    Unit/Bed#: [facility] 341-01 Encounter: [id number]    Primary Care Provider: Terrell Sepulveda DO   Date and time admitted to hospital: 8/22/2020  1:08 AM      Inpatient consult for Medical Clearance for [address] patient  Consult performed by: KELLIE Correa  Consult ordered by: Zay Braun MD        Medical clearance for psychiatric admission  Assessment & Plan  Patient is medically cleared for psychiatric admission  Vitals reviewed  Labs reviewed  Patient c/o dysuria and foul smelling urine  Will check UA  Dysuria  Assessment & Plan  Patient states she has foul smelling urine and dysuria  She is afebrile, no leukocytosis  Will check UA with reflex to C&S      Asthma, exercise induced  Assessment & Plan  Continue albuterol as needed  Instructed patient to inform nursing should her symptoms worsen  She does have slight wheeze throughout  Seasonal allergies  Assessment & Plan  Continue fluticasone, guaifenesin, loratadine      PTSD (post-traumatic stress disorder)  Assessment & Plan  As per psych    ZHANNA (generalized anxiety disorder)  Assessment & Plan  As per psych    * Persistent depressive disorder with anxious distress, currently severe  Assessment & Plan  As per psych    History of Present Illness:    Fernando Rodríguez is a 34 y o  female who is originally admitted to the psychiatry service on 8/22/2020 due to hx of PTSD, anxiety and depression  We are consulted for medical clearance  Patient presented to [facility] on 8/21/2020 reporting "I don't feel like I want to live anymore"  She was tearful and reported PTSD history with "outbursts"  Passive thoughts of dying "I don't care if I live or die"  Denied HI, AH, VH  Work up in ED included negative alcohol breath est, negative covid 19, negative urine pregnancy test  Urine drug screen positive for cocaine and THC       Labs from 8/23 revealed fasting lipid profile with slightly elevated triglycerides at 178  TSH WNL  Repeat labs today reveal triglycerides WNL  BMP and CBC WNL  Hepatic function WNL  Vitamin B12 is pending  No EKG available for review at time of consultation  On exam, patient states she has seasonal allergies which does make her asthma worse  She does not take any medication on a daily basis for her asthma but does use albuterol as needed  She is currently on fluticasone, mucinex, and loratidine for allergies  She states she does have night sweats for which primary team has ordered Prazosin  She states she has foul smelling urine and dysuria  Review of Systems:    Review of Systems   Constitutional: Negative for chills, fatigue and fever  Night sweats   HENT: Negative for congestion, postnasal drip, rhinorrhea, sneezing and sore throat  Eyes: Negative  Respiratory: Negative for cough, chest tightness and shortness of breath  Cardiovascular: Negative for chest pain, palpitations and leg swelling  Gastrointestinal: Negative for abdominal pain, constipation, diarrhea and nausea  Endocrine: Negative  Genitourinary: Positive for dysuria and frequency  Negative for difficulty urinating and flank pain  Musculoskeletal: Negative  Skin: Negative  Neurological: Negative for dizziness, light-headedness and headaches  Hematological: Negative for adenopathy  Does not bruise/bleed easily  Past Medical and Surgical History:     Past Medical History:   Diagnosis Date    Anxiety     Asthma     Depression     PTSD (post-traumatic stress disorder)        Past Surgical History:   Procedure Laterality Date    EAR SURGERY      TONSILLECTOMY  2010       Meds/Allergies:    all medications and allergies reviewed    Allergies:    Allergies   Allergen Reactions    Ciprofloxacin Vomiting       Social History:     Marital Status: Single    Substance Use History:   Social History     Substance and Sexual Activity Alcohol Use Yes    Alcohol/week: 2 0 standard drinks    Types: 2 Shots of liquor per week    Frequency: 2-3 times a week    Comment: occassional     Social History     Tobacco Use   Smoking Status Former Smoker   Smokeless Tobacco Never Used   Tobacco Comment    socially     Social History     Substance and Sexual Activity   Drug Use Yes    Types: Marijuana       Family History:  Reviewed    Physical Exam:     Vitals:   Blood Pressure: 128/64 (08/25/20 0746)  Pulse: 79 (08/25/20 0746)  Temperature: 97 7 °F (36 5 °C) (08/25/20 0746)  Temp Source: Temporal (08/25/20 0746)  Respirations: 16 (08/25/20 0746)  Height: 5' 7" (170 2 cm)(170 18 cm) (08/22/20 0109)  Weight - Scale: 89 kg (196 lb 3 4 oz) (08/22/20 0804)  SpO2: 97 % (08/22/20 0100)    Physical Exam  Vitals signs and nursing note reviewed  Constitutional:       General: She is not in acute distress  Appearance: Normal appearance  She is not ill-appearing  HENT:      Head: Normocephalic and atraumatic  Right Ear: External ear normal       Left Ear: External ear normal       Nose: Nose normal  No congestion or rhinorrhea  Mouth/Throat:      Mouth: Mucous membranes are moist       Pharynx: Oropharynx is clear  Eyes:      General: No scleral icterus  Right eye: No discharge  Left eye: No discharge  Extraocular Movements: Extraocular movements intact  Conjunctiva/sclera: Conjunctivae normal    Neck:      Musculoskeletal: Normal range of motion and neck supple  Cardiovascular:      Rate and Rhythm: Normal rate and regular rhythm  Pulses: Normal pulses  Heart sounds: Normal heart sounds  Pulmonary:      Effort: Pulmonary effort is normal       Breath sounds: Normal breath sounds  Abdominal:      General: Bowel sounds are normal       Palpations: Abdomen is soft  Tenderness: There is no abdominal tenderness  Musculoskeletal: Normal range of motion  General: No swelling or tenderness  Skin:     General: Skin is warm and dry  Neurological:      General: No focal deficit present  Mental Status: She is oriented to person, place, and time  M*Modal software was used to dictate this note  It may contain errors with dictating incorrect words or incorrect spelling  Please contact the provider directly with any questions  RCC, arterial thrombus on heparin gtt currently.  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  f/up heme-onc and vascular/urology recs.  transition to oral AC.  discharge later this week.    Lee Reece MD, MHA, FACP, [facility]  Pager: [phone]

## 2020-10-21 NOTE — H&P PST ADULT - PHYSICIAN'S ASSESSMENT OF RISK
- secondary to dyspepsia vs. vertigo?  - no prior history of upper gastrointestinal endoscopy  - will attempt to manage conservatively for now with ppi/carafate/antiemetics  - will re-evaluate if no improvement with medical management in 24-48 hours
Medium Risk

## 2021-02-24 PROBLEM — I10 ESSENTIAL (PRIMARY) HYPERTENSION: Chronic | Status: ACTIVE | Noted: 2018-08-17

## 2021-02-24 PROBLEM — C50.912 MALIGNANT NEOPLASM OF UNSPECIFIED SITE OF LEFT FEMALE BREAST: Chronic | Status: ACTIVE | Noted: 2018-08-17

## 2021-03-17 ENCOUNTER — APPOINTMENT (OUTPATIENT)
Dept: MRI IMAGING | Facility: IMAGING CENTER | Age: 51
End: 2021-03-17

## 2021-07-09 ENCOUNTER — FORM ENCOUNTER (OUTPATIENT)
Age: 51
End: 2021-07-09

## 2021-07-10 ENCOUNTER — APPOINTMENT (OUTPATIENT)
Dept: MRI IMAGING | Facility: IMAGING CENTER | Age: 51
End: 2021-07-10
Payer: MEDICAID

## 2021-07-10 ENCOUNTER — OUTPATIENT (OUTPATIENT)
Dept: OUTPATIENT SERVICES | Facility: HOSPITAL | Age: 51
LOS: 1 days | End: 2021-07-10
Payer: MEDICAID

## 2021-07-10 DIAGNOSIS — R89.7 ABNORMAL HISTOLOGICAL FINDINGS IN SPECIMENS FROM OTHER ORGANS, SYSTEMS AND TISSUES: Chronic | ICD-10-CM

## 2021-07-10 DIAGNOSIS — Z98.890 OTHER SPECIFIED POSTPROCEDURAL STATES: Chronic | ICD-10-CM

## 2021-07-10 DIAGNOSIS — Z98.891 HISTORY OF UTERINE SCAR FROM PREVIOUS SURGERY: Chronic | ICD-10-CM

## 2021-07-10 DIAGNOSIS — Z90.49 ACQUIRED ABSENCE OF OTHER SPECIFIED PARTS OF DIGESTIVE TRACT: Chronic | ICD-10-CM

## 2021-07-10 DIAGNOSIS — N28.89 OTHER SPECIFIED DISORDERS OF KIDNEY AND URETER: ICD-10-CM

## 2021-07-10 PROCEDURE — 74183 MRI ABD W/O CNTR FLWD CNTR: CPT | Mod: 26

## 2021-07-10 PROCEDURE — 74183 MRI ABD W/O CNTR FLWD CNTR: CPT

## 2021-07-10 PROCEDURE — A9585: CPT

## 2021-07-26 ENCOUNTER — APPOINTMENT (OUTPATIENT)
Dept: UROLOGY | Facility: CLINIC | Age: 51
End: 2021-07-26
Payer: MEDICAID

## 2021-07-26 PROCEDURE — 99213 OFFICE O/P EST LOW 20 MIN: CPT

## 2021-08-01 NOTE — HISTORY OF PRESENT ILLNESS
[FreeTextEntry1] : 47 yo F with history of breast cancer, in remission\par Recently presented to the ER for right leg pain and found to have arterial thrombus which appeared to be chronic on angiogram\par Incidental finding of right renal mass\par Denies any flank pain or gross hematuria\par \par 7/26/21 Interval history: Hasn't been seen since Aug 2019\par Since then ended up getting renal mass ablated with IR\par Core biopsy showed oncocytoma\par No issues since then\par Had recent MRI which showed no evidence of recurrent tumor

## 2021-08-01 NOTE — ASSESSMENT
[FreeTextEntry1] : 51 yo F with history of oncocytoma s/p ablation\par \par - Reviewed MRI imaging from last week available on PACS and discussed findings with pt\par - FU in 1 year for renal US

## 2021-08-01 NOTE — PHYSICAL EXAM
[General Appearance - Well Developed] : well developed [General Appearance - Well Nourished] : well nourished [Normal Appearance] : normal appearance [Well Groomed] : well groomed [General Appearance - In No Acute Distress] : no acute distress [Abdomen Soft] : soft [Abdomen Tenderness] : non-tender [Costovertebral Angle Tenderness] : no ~M costovertebral angle tenderness [Urinary Bladder Findings] : the bladder was normal on palpation [Edema] : no peripheral edema [] : no respiratory distress [Respiration, Rhythm And Depth] : normal respiratory rhythm and effort [Exaggerated Use Of Accessory Muscles For Inspiration] : no accessory muscle use [Oriented To Time, Place, And Person] : oriented to person, place, and time [Affect] : the affect was normal [Mood] : the mood was normal [Not Anxious] : not anxious [FreeTextEntry1] : ambulates with cane [No Focal Deficits] : no focal deficits [No Palpable Adenopathy] : no palpable adenopathy

## 2022-01-01 NOTE — ED PROVIDER NOTE - EYES, MLM
Apnea of prematurity Immature thermoregulation Clear bilaterally, pupils equal, round and reactive to light.

## 2022-04-06 ENCOUNTER — NON-APPOINTMENT (OUTPATIENT)
Age: 52
End: 2022-04-06

## 2022-04-06 NOTE — ED ADULT NURSE NOTE - ED STAT RN HANDOFF TIME
Outpatient Medications Marked as Taking for the 4/6/22 encounter (Refill) with Christian Quinn MD   Medication Sig Dispense Refill   • lisdexamfetamine (Vyvanse) 70 MG capsule Take 1 capsule by mouth every morning. 30 capsule 0        Ok to leave detailed Message: Yes  Informed caller of refill policy- 48-72 hours: Yes  No call back needed unless nurse has questions.     Pharmacy: [id number] - [place], [place] - [address]    19:19

## 2022-04-13 ENCOUNTER — APPOINTMENT (OUTPATIENT)
Dept: VASCULAR SURGERY | Facility: CLINIC | Age: 52
End: 2022-04-13

## 2022-04-20 ENCOUNTER — APPOINTMENT (OUTPATIENT)
Dept: VASCULAR SURGERY | Facility: CLINIC | Age: 52
End: 2022-04-20
Payer: MEDICAID

## 2022-04-20 VITALS
WEIGHT: 220 LBS | SYSTOLIC BLOOD PRESSURE: 150 MMHG | TEMPERATURE: 97.3 F | HEART RATE: 94 BPM | DIASTOLIC BLOOD PRESSURE: 87 MMHG | HEIGHT: 66 IN | BODY MASS INDEX: 35.36 KG/M2

## 2022-04-20 DIAGNOSIS — I73.9 PERIPHERAL VASCULAR DISEASE, UNSPECIFIED: ICD-10-CM

## 2022-04-20 PROCEDURE — 93923 UPR/LXTR ART STDY 3+ LVLS: CPT

## 2022-04-20 PROCEDURE — 99213 OFFICE O/P EST LOW 20 MIN: CPT

## 2022-05-06 ENCOUNTER — APPOINTMENT (OUTPATIENT)
Dept: VASCULAR SURGERY | Facility: CLINIC | Age: 52
End: 2022-05-06

## 2022-07-21 ENCOUNTER — APPOINTMENT (OUTPATIENT)
Dept: UROLOGY | Facility: CLINIC | Age: 52
End: 2022-07-21

## 2022-07-21 PROCEDURE — 76775 US EXAM ABDO BACK WALL LIM: CPT

## 2022-07-26 PROBLEM — I73.9 PVD (PERIPHERAL VASCULAR DISEASE): Status: ACTIVE | Noted: 2022-07-26

## 2022-07-26 RX ORDER — CILOSTAZOL 100 MG/1
100 TABLET ORAL TWICE DAILY
Qty: 180 | Refills: 3 | Status: ACTIVE | COMMUNITY
Start: 2022-04-20 | End: 1900-01-01

## 2022-08-15 NOTE — PROGRESS NOTE ADULT - PROBLEM/PLAN-2
DISPLAY PLAN FREE TEXT
DISPLAY PLAN FREE TEXT
If you are a smoker, it is important for your health to stop smoking. Please be aware that second hand smoke is also harmful.
DISPLAY PLAN FREE TEXT

## 2022-10-26 ENCOUNTER — APPOINTMENT (OUTPATIENT)
Dept: VASCULAR SURGERY | Facility: CLINIC | Age: 52
End: 2022-10-26

## 2022-11-09 NOTE — ED PROVIDER NOTE - NS ED MD DISPO SPECIAL CONSIDERATION1
Impression: S/P Cataract Extraction/IOL/LRI OD - 6 Days. Presence of intraocular lens  Z96.1. Excellent post op course Plan: Doing excellent. Proceed c 2nd eye sx. --Advised patient to use artificial tears for comfort. None

## 2023-01-13 ENCOUNTER — APPOINTMENT (OUTPATIENT)
Dept: VASCULAR SURGERY | Facility: CLINIC | Age: 53
End: 2023-01-13
Payer: MEDICAID

## 2023-01-13 VITALS
BODY MASS INDEX: 36.96 KG/M2 | HEIGHT: 66 IN | WEIGHT: 230 LBS | DIASTOLIC BLOOD PRESSURE: 86 MMHG | HEART RATE: 75 BPM | SYSTOLIC BLOOD PRESSURE: 138 MMHG

## 2023-01-13 PROCEDURE — 93924 LWR XTR VASC STDY BILAT: CPT

## 2023-01-13 PROCEDURE — 93970 EXTREMITY STUDY: CPT

## 2023-01-13 PROCEDURE — 99215 OFFICE O/P EST HI 40 MIN: CPT

## 2023-01-16 NOTE — CONSULT LETTER
[Dear  ___] : Dear  [unfilled], [Consult Letter:] : I had the pleasure of evaluating your patient, [unfilled]. [Please see my note below.] : Please see my note below. [Consult Closing:] : Thank you very much for allowing me to participate in the care of this patient.  If you have any questions, please do not hesitate to contact me. [Sincerely,] : Sincerely, [FreeTextEntry3] : René Gerber M.D., F.ELIZS., R.P.SOPHIA.I.\par  of Vascular Surgery\par Assistant Professor of Radiology\par Director of Endovascular Program/ Vascular Access Center\par [facility]

## 2023-01-16 NOTE — ASSESSMENT
[FreeTextEntry1] : Patient with severe peripheral vascular disease with symptoms of disabling claudication and mild rest pain of the right lower extremity.  This is secondary to acute thrombosis of the right SFA about 2 years ago.  This was presumed to be hypercoagulability secondary to tamoxifen and the patient has been off tamoxifen since that.\par \par I had a long conversation with the patient and hematology service.  I would like to complete the repeat of the hypercoagulable state before proceeding with further invasive work-up and possible revascularization.  The patient should continue with the Plavix in the interim.  We will see her in 4 to 6 weeks after full hematological work-up is complete.

## 2023-01-16 NOTE — HISTORY OF PRESENT ILLNESS
[FreeTextEntry1] : Patient is a 52-year-old female with a complicated past medical history presenting to us for evaluation of right lower extremity claudication symptoms.\par \par Patient has a history of breast carcinoma which was diagnosed in 2018.  Patient was treated with subsequent treatment with tamoxifen.  In 2019 she presented to the hospital with severe ischemia of the right lower extremity and was diagnosed with acute arterial thrombosis of the right SFA.\par \par Patient was managed medically and was treated with Lovenox for 1 year with slight improvement of symptoms.  Since then the patient has continued to have significant claudication symptoms of the right lower extremity of less than 1 block associated with mild rest pain.\par \par Patient has been treated with Plavix since 2020.  No history of smoking.  No significant cardiac disease.  No family history of DVTs.

## 2023-01-16 NOTE — PHYSICAL EXAM
[JVD] : no jugular venous distention  [Normal Breath Sounds] : Normal breath sounds [Normal Heart Sounds] : normal heart sounds [0] : right 0 [2+] : left 2+ [Ankle Swelling (On Exam)] : present [Ankle Swelling On The Right] : of the right ankle [Ankle Swelling On The Left] : moderate [Skin Ulcer] : no ulcer

## 2023-02-24 ENCOUNTER — APPOINTMENT (OUTPATIENT)
Dept: VASCULAR SURGERY | Facility: CLINIC | Age: 53
End: 2023-02-24
Payer: MEDICAID

## 2023-02-24 VITALS
WEIGHT: 230 LBS | DIASTOLIC BLOOD PRESSURE: 87 MMHG | HEIGHT: 66 IN | HEART RATE: 76 BPM | SYSTOLIC BLOOD PRESSURE: 144 MMHG | BODY MASS INDEX: 36.96 KG/M2

## 2023-02-24 PROCEDURE — 99214 OFFICE O/P EST MOD 30 MIN: CPT

## 2023-03-31 ENCOUNTER — APPOINTMENT (OUTPATIENT)
Dept: VASCULAR SURGERY | Facility: CLINIC | Age: 53
End: 2023-03-31
Payer: MEDICAID

## 2023-03-31 VITALS — BODY MASS INDEX: 36.48 KG/M2 | HEIGHT: 66 IN | WEIGHT: 227 LBS

## 2023-03-31 VITALS — HEART RATE: 71 BPM | DIASTOLIC BLOOD PRESSURE: 94 MMHG | SYSTOLIC BLOOD PRESSURE: 140 MMHG

## 2023-03-31 PROCEDURE — 99214 OFFICE O/P EST MOD 30 MIN: CPT

## 2023-03-31 RX ORDER — CILOSTAZOL 50 MG/1
50 TABLET ORAL
Qty: 180 | Refills: 3 | Status: ACTIVE | COMMUNITY
Start: 2023-03-31 | End: 1900-01-01

## 2023-03-31 NOTE — ASSESSMENT
[FreeTextEntry1] : Patient with severe peripheral vascular disease with symptoms of disabling claudication and mild rest pain of the right lower extremity.  This is secondary to acute thrombosis of the right SFA about 2 years ago.  This was presumed to be hypercoagulability secondary to tamoxifen and the patient has been off tamoxifen since that.\par \par Patient had a full hypercoagulable work-up which was negative.\par \par Patient reports the symptoms of claudication have improved significantly and currently she does not complain of disabling claudication.\par \par Continue Plavix.  Decrease Pletal to 50 mg twice a day.  Continue aspirin.\par \par Follow-up in 6 months with repeat arterial Dopplers with exercise.

## 2023-06-14 ENCOUNTER — APPOINTMENT (OUTPATIENT)
Dept: UROLOGY | Facility: CLINIC | Age: 53
End: 2023-06-14

## 2023-06-14 ENCOUNTER — APPOINTMENT (OUTPATIENT)
Dept: UROLOGY | Facility: CLINIC | Age: 53
End: 2023-06-14
Payer: MEDICAID

## 2023-06-14 DIAGNOSIS — N28.89 OTHER SPECIFIED DISORDERS OF KIDNEY AND URETER: ICD-10-CM

## 2023-06-14 PROCEDURE — 99213 OFFICE O/P EST LOW 20 MIN: CPT

## 2023-06-14 PROCEDURE — 76775 US EXAM ABDO BACK WALL LIM: CPT

## 2023-06-14 NOTE — HISTORY OF PRESENT ILLNESS
[FreeTextEntry1] : Is a 52-year-old female with a right renal mass that was ablated by IR in October 2019.  The patient has had follow-up imaging with MRI and ultrasound which have been negative.  The patient is here today for renal ultrasound and surveillance following her IR ablation.\par \par We discussed the results of her renal ultrasound which did not demonstrate any new renal masses.\par \par Overall the patient feels well without any urinary complaints.  She does have occasional right-sided flank pain which she states is from over 20 years ago following her cholecystectomy.

## 2023-06-14 NOTE — ASSESSMENT
[FreeTextEntry1] : The patient is a 52-year-old female with a right renal mass that had been biopsied showing oncocytoma and ablated by IR in 2019.  All of her surveillance imaging since then have been negative.  I reviewed her renal ultrasound done today which did not demonstrate any new renal masses or hydronephrosis.  The patient will undergo cross-sectional imaging in 1 year and follow-up for review of those results.  I have also reviewed her recent lab work (2/20/23) showing cr of 0.75.  She will submit urine specimen for urinalysis\par \par Plan \par Urinalysis\par Renal ultrasound done today: I have reviewed the images which did not demonstrate any new renal masses and discussed with the patient\par MRI in 1 year\par Follow-up in 1 year for physical exam and discussion of her imaging

## 2023-06-14 NOTE — PHYSICAL EXAM
[Normal Appearance] : normal appearance [Abdomen Soft] : soft [Abdomen Tenderness] : non-tender [Urinary Bladder Findings] : the bladder was normal on palpation [Edema] : no peripheral edema [] : no respiratory distress [Not Anxious] : not anxious [Normal Station and Gait] : the gait and station were normal for the patient's age [No Focal Deficits] : no focal deficits [No Palpable Adenopathy] : no palpable adenopathy

## 2023-06-16 LAB
APPEARANCE: CLEAR
BACTERIA: ABNORMAL /HPF
BILIRUBIN URINE: NEGATIVE
BLOOD URINE: NEGATIVE
CAST: 1 /LPF
COLOR: YELLOW
EPITHELIAL CELLS: 11 /HPF
GLUCOSE QUALITATIVE U: NEGATIVE MG/DL
KETONES URINE: NEGATIVE MG/DL
LEUKOCYTE ESTERASE URINE: NEGATIVE
MICROSCOPIC-UA: NORMAL
NITRITE URINE: NEGATIVE
PH URINE: 7
PROTEIN URINE: NEGATIVE MG/DL
RED BLOOD CELLS URINE: 3 /HPF
SPECIFIC GRAVITY URINE: 1.02
UROBILINOGEN URINE: 0.2 MG/DL
WHITE BLOOD CELLS URINE: 2 /HPF

## 2023-07-24 ENCOUNTER — APPOINTMENT (OUTPATIENT)
Age: 53
End: 2023-07-24

## 2023-10-13 ENCOUNTER — EMERGENCY (EMERGENCY)
Facility: HOSPITAL | Age: 53
LOS: 1 days | Discharge: HOME | End: 2023-10-13
Attending: EMERGENCY MEDICINE
Payer: MEDICAID

## 2023-10-13 VITALS
WEIGHT: 214.95 LBS | TEMPERATURE: 98 F | SYSTOLIC BLOOD PRESSURE: 182 MMHG | RESPIRATION RATE: 19 BRPM | OXYGEN SATURATION: 97 % | HEIGHT: 66 IN | DIASTOLIC BLOOD PRESSURE: 69 MMHG | HEART RATE: 62 BPM

## 2023-10-13 VITALS — WEIGHT: 214.95 LBS | HEIGHT: 66 IN

## 2023-10-13 DIAGNOSIS — Z98.890 OTHER SPECIFIED POSTPROCEDURAL STATES: Chronic | ICD-10-CM

## 2023-10-13 DIAGNOSIS — Z90.49 ACQUIRED ABSENCE OF OTHER SPECIFIED PARTS OF DIGESTIVE TRACT: Chronic | ICD-10-CM

## 2023-10-13 DIAGNOSIS — R89.7 ABNORMAL HISTOLOGICAL FINDINGS IN SPECIMENS FROM OTHER ORGANS, SYSTEMS AND TISSUES: Chronic | ICD-10-CM

## 2023-10-13 DIAGNOSIS — Z98.891 HISTORY OF UTERINE SCAR FROM PREVIOUS SURGERY: Chronic | ICD-10-CM

## 2023-10-13 PROCEDURE — 72131 CT LUMBAR SPINE W/O DYE: CPT | Mod: 26,MA

## 2023-10-13 PROCEDURE — 99223 1ST HOSP IP/OBS HIGH 75: CPT

## 2023-10-13 PROCEDURE — 72128 CT CHEST SPINE W/O DYE: CPT | Mod: 26,MA

## 2023-10-13 PROCEDURE — 96372 THER/PROPH/DIAG INJ SC/IM: CPT

## 2023-10-13 PROCEDURE — 93971 EXTREMITY STUDY: CPT | Mod: 26,RT

## 2023-10-13 PROCEDURE — 72131 CT LUMBAR SPINE W/O DYE: CPT | Mod: MA

## 2023-10-13 PROCEDURE — 93926 LOWER EXTREMITY STUDY: CPT | Mod: 26,RT

## 2023-10-13 PROCEDURE — 72128 CT CHEST SPINE W/O DYE: CPT | Mod: MA

## 2023-10-13 PROCEDURE — 93926 LOWER EXTREMITY STUDY: CPT

## 2023-10-13 PROCEDURE — 99284 EMERGENCY DEPT VISIT MOD MDM: CPT | Mod: 25

## 2023-10-13 PROCEDURE — 93971 EXTREMITY STUDY: CPT

## 2023-10-13 PROCEDURE — 99285 EMERGENCY DEPT VISIT HI MDM: CPT

## 2023-10-13 RX ORDER — LIDOCAINE 4 G/100G
1 CREAM TOPICAL ONCE
Refills: 0 | Status: COMPLETED | OUTPATIENT
Start: 2023-10-13 | End: 2023-10-13

## 2023-10-13 RX ORDER — KETOROLAC TROMETHAMINE 30 MG/ML
15 SYRINGE (ML) INJECTION ONCE
Refills: 0 | Status: DISCONTINUED | OUTPATIENT
Start: 2023-10-13 | End: 2023-10-13

## 2023-10-13 RX ORDER — DIAZEPAM 5 MG
5 TABLET ORAL ONCE
Refills: 0 | Status: DISCONTINUED | OUTPATIENT
Start: 2023-10-13 | End: 2023-10-13

## 2023-10-13 RX ORDER — DIPHENHYDRAMINE HCL 50 MG
25 CAPSULE ORAL ONCE
Refills: 0 | Status: DISCONTINUED | OUTPATIENT
Start: 2023-10-13 | End: 2023-10-13

## 2023-10-13 RX ORDER — IBUPROFEN 200 MG
1 TABLET ORAL
Qty: 9 | Refills: 0
Start: 2023-10-13 | End: 2023-10-15

## 2023-10-13 RX ORDER — METHOCARBAMOL 500 MG/1
2 TABLET, FILM COATED ORAL
Qty: 18 | Refills: 0
Start: 2023-10-13 | End: 2023-10-15

## 2023-10-13 RX ADMIN — Medication 5 MILLIGRAM(S): at 11:54

## 2023-10-13 RX ADMIN — LIDOCAINE 1 PATCH: 4 CREAM TOPICAL at 06:47

## 2023-10-13 RX ADMIN — Medication 15 MILLIGRAM(S): at 06:48

## 2023-10-13 RX ADMIN — Medication 15 MILLIGRAM(S): at 12:00

## 2023-10-13 RX ADMIN — Medication 5 MILLIGRAM(S): at 06:47

## 2023-10-13 NOTE — ED PROVIDER NOTE - PROGRESS NOTE DETAILS
Dr. Quintana: Received signout on patient.  52-year-old female history of breast cancer status post lumpectomy in the past, no meds, not on chemo, was on tamoxifen but stopped because in 2019 patient developed right lower extremity DVT, was on Lovenox but now on Plavix.  Also history of right SFA occlusion in the past.  Coming in with right-sided back pain radiating to the right lower extremity similar to sciatica.  No trauma, no fevers or chills, no numbness tingling or weakness in lower extremities.  No bowel or bladder incontinence.  Patient seen at bedside, son at bedside as well, well-appearing, minimal distress due to right lower extremity pain.  Right lower extremity warm to touch, well-perfused, minimally tender Compared to left lower extremity.  Patient awaiting right lower extremity DVT study and right lower extremity arterial study. Arterial duplex resulted, noted segmental occlusions of distal superficial femoral artery, popliteal artery and proximal posterior tibial and peroneal arteries. Vascular surgery aware of consult, will see pt. - Tian Allen PA-C Vascular surgery at bedside. - Tian Allen PA-C Per vascular surgery cleared for discharge from vascular standpoint.  Recommended orthospine as needed for possible sciatic nature of pain.  Additionally state patient has appointment with her vascular surgeon next week and will follow-up then regarding ultrasound findings.  Patient reassessed and feels improved with pain, states she is been taking ibuprofen 600 mg at home with intermittent relief of pain and feels comfortable with discharge home at this time.  Will discharge home with Robaxin as needed and several days of 600 mg ibuprofen.  Patient aware to avoid any additional NSAIDs while taking this and to follow-up with outpatient spine.  Safer discharge. ED attending aware. - Tian Allen PA-C Per vascular surgery cleared for discharge from vascular standpoint.  Recommended orthospine as needed for possible sciatic nature of pain.  Additionally state patient has appointment with her vascular surgeon next week and will follow-up then regarding ultrasound findings.  Patient reassessed and feels improved with pain, CT and US results explained to pt, states she is been taking ibuprofen 600 mg at home with intermittent relief of pain and feels comfortable with discharge home at this time.  Will discharge home with Robaxin as needed and several days of 600 mg ibuprofen.  Patient aware to avoid any additional NSAIDs while taking this and to follow-up with outpatient spine.  Safer discharge. ED attending aware. - Tian Allen PA-C

## 2023-10-13 NOTE — ED ADULT NURSE NOTE - OBJECTIVE STATEMENT
"  Assessment & Plan   Problem List Items Addressed This Visit        Nervous and Auditory    Postherpetic polyneuropathy    Relevant Medications    gabapentin (NEURONTIN) 300 MG capsule    zolpidem (AMBIEN) 10 MG tablet    cyclobenzaprine (FLEXERIL) 10 MG tablet   Other Visit Diagnoses     FITZGERALD (dyspnea on exertion)    -  Primary    Relevant Orders    NM Lexiscan stress test    Persistent insomnia        Relevant Medications    zolpidem (AMBIEN) 10 MG tablet    Cervicalgia        Relevant Medications    cyclobenzaprine (FLEXERIL) 10 MG tablet             30 minutes spent on the date of the encounter doing chart review, review of test results, interpretation of tests, patient visit and documentation        BMI:   Estimated body mass index is 33.28 kg/m  as calculated from the following:    Height as of 6/8/22: 1.575 m (5' 2.01\").    Weight as of this encounter: 82.6 kg (182 lb).           No follow-ups on file.    Balta Milton, [facility]    Enzo Looney is a 72 year old, presenting for the following health issues:      CHAI Looney presents today due to FITZGERALD.  Denies associated chest pain.  Vague report of palpitations or racing heart.  Recent had a CTA of chest due to concern for effusions on MRI of the thoracic spine.  CTA of chest only showed a stable nodule. She has no cardiac history.  Thoracic back pain and side pain I saw her for recently has resolved.  She is doing therapy for this.      Concern - shortness of breath intermittent   Onset: x 2 1/2 months   Description: shortness of breath comes and goes when sitting, increases with exertion   Intensity: mild-moderate   Progression of Symptoms:  same  Accompanying Signs & Symptoms: no   Previous history of similar problem: no   Precipitating factors:        Worsened by: exertion, ambulation - at times occurs at rest   Alleviating factors:        Improved by: no   Therapies tried and outcome: None    Medications: " patient has starting taking Omega XL        Review of Systems   Constitutional, HEENT, cardiovascular, pulmonary, gi and gu systems are negative, except as otherwise noted.      Objective    BP (!) 148/78 (BP Location: Right arm, Patient Position: Sitting, Cuff Size: Adult Regular)   Pulse 94   Temp 97.9  F (36.6  C) (Tympanic)   Resp 24   Wt 82.6 kg (182 lb)   SpO2 98%   BMI 33.28 kg/m    Body mass index is 33.28 kg/m .  Physical Exam   GENERAL: alert and no distress  EYES: Eyes grossly normal to inspection, PERRL and conjunctivae and sclerae normal  RESP: lungs clear to auscultation - no rales, rhonchi or wheezes  CV: regular rate and rhythm, normal S1 S2, no S3 or S4, no murmurs  ABDOMEN: soft, nontender, no hepatosplenomegaly, no masses and bowel sounds normal  SKIN: no suspicious lesions or rashes  NEURO: Normal strength and tone, mentation intact and speech normal  PSYCH: mentation appears normal, affect normal/bright    Admission on 09/28/2022, Discharged on 09/28/2022   Component Date Value Ref Range Status     Troponin I High Sensitivity 09/28/2022 7  <54 ng/L Final    This Troponin-I result was obtained using a Siemens Dimension Vista High Sensitivity Troponin-I assay (TNIH). Effective 11/23/21, nine labs/sites in the [facility] switched from a Siemens [place] Contemporary Troponin I assay (CTNI) to a Siemens [place] High-Sensitivity Troponin I assay (TNIH).     WBC Count 09/28/2022 8.2  4.0 - 11.0 10e3/uL Final     RBC Count 09/28/2022 4.52  3.80 - 5.20 10e6/uL Final     Hemoglobin 09/28/2022 12.9  11.7 - 15.7 g/dL Final     Hematocrit 09/28/2022 40.6  35.0 - 47.0 % Final     MCV 09/28/2022 90  78 - 100 fL Final     MCH 09/28/2022 28.5  26.5 - 33.0 pg Final     MCHC 09/28/2022 31.8  31.5 - 36.5 g/dL Final     RDW 09/28/2022 14.6  10.0 - 15.0 % Final     Platelet Count 09/28/2022 297  150 - 450 10e3/uL Final     D-Dimer Quantitative 09/28/2022 0.35  0.00 - 0.50 ug/mL FEU Final     Sodium  09/28/2022 139  133 - 144 mmol/L Final     Potassium 09/28/2022 3.8  3.4 - 5.3 mmol/L Final     Chloride 09/28/2022 109  94 - 109 mmol/L Final     Carbon Dioxide (CO2) 09/28/2022 26  20 - 32 mmol/L Final     Anion Gap 09/28/2022 4  3 - 14 mmol/L Final     Urea Nitrogen 09/28/2022 9  7 - 30 mg/dL Final     Creatinine 09/28/2022 0.49 (L)  0.52 - 1.04 mg/dL Final     Calcium 09/28/2022 8.4 (L)  8.5 - 10.1 mg/dL Final     Glucose 09/28/2022 97  70 - 99 mg/dL Final     Alkaline Phosphatase 09/28/2022 93  40 - 150 U/L Final     AST 09/28/2022 22  0 - 45 U/L Final     ALT 09/28/2022 44  0 - 50 U/L Final     Protein Total 09/28/2022 6.9  6.8 - 8.8 g/dL Final     Albumin 09/28/2022 3.6  3.4 - 5.0 g/dL Final     Bilirubin Total 09/28/2022 0.2  0.2 - 1.3 mg/dL Final     GFR Estimate 09/28/2022 >90  >60 mL/min/1.73m2 Final    Effective December 21, 2021 eGFRcr in adults is calculated using the 2021 CKD-EPI creatinine equation which includes age and gender (Gwendolyn et al., [place], DOI: 10.1056/XYSRaf3299616)     Magnesium 09/28/2022 2.0  1.6 - 2.3 mg/dL Final     Lactic Acid 09/28/2022 1.7  0.7 - 2.0 mmol/L Final     Culture 09/28/2022 No Growth   Final     No results found for any visits on 11/21/22.  No results found for this or any previous visit (from the past 24 hour(s)).                 52Y female, A&Ox4, left breast CA, DVT in right leg. pt presents to the ED c/o right lower back pain x few weeks stating it has gotten progressively worse in the past few days. states pain travels down right leg. endorses burning feeling in right thigh and right shin. states she has had psoatica pain in the past but not for "a long time". denies headahce, vision changes, sob, cp, abd pain, numbness/tingling.

## 2023-10-13 NOTE — ED PROVIDER NOTE - PATIENT PORTAL LINK FT
You can access the FollowMyHealth Patient Portal offered by [facility] by registering at the following website: [email or web address]. By joining [facility]’s FollowMyHealth portal, you will also be able to view your health information using other applications (apps) compatible with our system.

## 2023-10-13 NOTE — ED PROVIDER NOTE - NSFOLLOWUPINSTRUCTIONS_ED_ALL_ED_FT
– Return for any worsening or concerning symptoms (see below).  – Follow up with primary care doctor in the next 5 to 7 days.     Sciatica    WHAT YOU NEED TO KNOW:  Sciatica is a condition that causes pain along your sciatic nerve. The sciatic nerve runs from your spine through both sides of your buttocks. It then runs down the back of your thigh, into your lower leg and foot. Your sciatic nerve may be compressed, inflamed, irritated, or stretched.    DISCHARGE INSTRUCTIONS:  Medicines:   •NSAIDs: These medicines decrease swelling and pain. NSAIDs are available without a doctor's order. Ask your healthcare provider which medicine is right for you. Ask how much to take and when to take it. Take as directed. NSAIDs can cause stomach bleeding or kidney problems if not taken correctly.  •Acetaminophen: This medicine decreases pain. Acetaminophen is available without a doctor's order. Ask how much to take and when to take it. Follow directions. Acetaminophen can cause liver damage if not taken correctly.  •Muscle relaxers help decrease pain and muscle spasms.  •Take your medicine as directed. Contact your healthcare provider if you think your medicine is not helping or if you have side effects. Tell him or her if you are allergic to any medicine. Keep a list of the medicines, vitamins, and herbs you take. Include the amounts, and when and why you take them. Bring the list or the pill bottles to follow-up visits. Carry your medicine list with you in case of an emergency.  Follow up with your healthcare provider as directed: Write down your questions so you remember to ask them during your visits.   Manage your symptoms:   •Activity: Decrease your activity. Do not lift heavy objects or twist your back for at least 6 weeks. Slowly return to your usual activity.  •Ice: Ice helps decrease swelling and pain. Ice may also help prevent tissue damage. Use an ice pack, or put crushed ice in a plastic bag. Cover it with a towel and place it on your low back or leg for 15 to 20 minutes every hour or as directed.  •Heat: Heat helps decrease pain and muscle spasms. Apply heat on the area for 20 to 30 minutes every 2 hours for as many days as directed.   •Physical therapy: You may need to see a physical therapist to teach you exercises to help improve movement and strength, and to decrease pain. An occupational therapist teaches you skills to help with your daily activities.   •Use assistive devices if directed: You may need to wear back support, such as a back brace. You may need crutches, a cane, or a walker to decrease stress on your lower back and leg muscles. Ask your healthcare provider for more information about assistive devices and how to use them correctly.  Self-care:   •Avoid pressure on your back and legs: Do not lift heavy objects, or stand or sit for long periods of time.  •Lift objects safely: Keep your back straight and bend your knees when you  an object. Do not bend or twist your back when you lift.  •Maintain a healthy weight: Ask your healthcare provider how much you should weigh. Ask him to help you create a weight loss plan if you are overweight.   •Exercise: Ask your healthcare provider about the best stretching, warmup, and exercise plan for you.   Contact your healthcare provider if:   •You have pain in your lower back at night or when resting.  •You have pain in your lower back with numbness below the knee.  •You have weakness in one leg only.  •You have questions or concerns about your condition or care.  Seek care immediately or call 911 if:   •You have trouble holding back your urine or bowel movements.  •You have weakness in both legs.  •You have numbness in your groin or buttocks. There are spine specialists within the [facility] system you may call [phone] for your back pain, call and arrange your follow up appointment.    Follow up with your vascular surgeon Dr. Gerber next week as advised.    Rest, no heavy lifting.  Warm compresses to area.  Light walking.     For pain, take Tylenol (3 regular strength 325 mg tabs or 2 Extra strength 500 mg tablets) every 8 hours as needed. Take Motrin 600 mg every 8 hours as needed for additional pain relief. You may use over-the-counter Lidoderm/Lidocaine (Salonpas) patches to the area for additional relief - please follow instructions on the packaging for appropriate usage.    Take muscle relaxer Robaxin 1.5 mg every 8 hours as needed for muscle spasm.     If you develop any new/worsening symptoms including (but not limited to) worsening pain, loss of control of your bowel/bladder function, numbness, weakness, difficulty walking, fever or any other concerning symptoms, please return to the Emergency Department immediately.     – Return for any worsening or concerning symptoms (see below).  – Follow up with primary care doctor in the next 5 to 7 days.     Sciatica    WHAT YOU NEED TO KNOW:  Sciatica is a condition that causes pain along your sciatic nerve. The sciatic nerve runs from your spine through both sides of your buttocks. It then runs down the back of your thigh, into your lower leg and foot. Your sciatic nerve may be compressed, inflamed, irritated, or stretched.    DISCHARGE INSTRUCTIONS:  Medicines:   •NSAIDs: These medicines decrease swelling and pain. NSAIDs are available without a doctor's order. Ask your healthcare provider which medicine is right for you. Ask how much to take and when to take it. Take as directed. NSAIDs can cause stomach bleeding or kidney problems if not taken correctly.  •Acetaminophen: This medicine decreases pain. Acetaminophen is available without a doctor's order. Ask how much to take and when to take it. Follow directions. Acetaminophen can cause liver damage if not taken correctly.  •Muscle relaxers help decrease pain and muscle spasms.  •Take your medicine as directed. Contact your healthcare provider if you think your medicine is not helping or if you have side effects. Tell him or her if you are allergic to any medicine. Keep a list of the medicines, vitamins, and herbs you take. Include the amounts, and when and why you take them. Bring the list or the pill bottles to follow-up visits. Carry your medicine list with you in case of an emergency.  Follow up with your healthcare provider as directed: Write down your questions so you remember to ask them during your visits.   Manage your symptoms:   •Activity: Decrease your activity. Do not lift heavy objects or twist your back for at least 6 weeks. Slowly return to your usual activity.  •Ice: Ice helps decrease swelling and pain. Ice may also help prevent tissue damage. Use an ice pack, or put crushed ice in a plastic bag. Cover it with a towel and place it on your low back or leg for 15 to 20 minutes every hour or as directed.  •Heat: Heat helps decrease pain and muscle spasms. Apply heat on the area for 20 to 30 minutes every 2 hours for as many days as directed.   •Physical therapy: You may need to see a physical therapist to teach you exercises to help improve movement and strength, and to decrease pain. An occupational therapist teaches you skills to help with your daily activities.   •Use assistive devices if directed: You may need to wear back support, such as a back brace. You may need crutches, a cane, or a walker to decrease stress on your lower back and leg muscles. Ask your healthcare provider for more information about assistive devices and how to use them correctly.  Self-care:   •Avoid pressure on your back and legs: Do not lift heavy objects, or stand or sit for long periods of time.  •Lift objects safely: Keep your back straight and bend your knees when you  an object. Do not bend or twist your back when you lift.  •Maintain a healthy weight: Ask your healthcare provider how much you should weigh. Ask him to help you create a weight loss plan if you are overweight.   •Exercise: Ask your healthcare provider about the best stretching, warmup, and exercise plan for you.   Contact your healthcare provider if:   •You have pain in your lower back at night or when resting.  •You have pain in your lower back with numbness below the knee.  •You have weakness in one leg only.  •You have questions or concerns about your condition or care.  Seek care immediately or call 911 if:   •You have trouble holding back your urine or bowel movements.  •You have weakness in both legs.  •You have numbness in your groin or buttocks.

## 2023-10-13 NOTE — CONSULT NOTE ADULT - ASSESSMENT
NAIWR X1  
52F with known severe peripheral vascular disease with symptoms of disabling claudication and mild rest pain of the right lower extremity. She is scheduled for 6 month followup with Dr. Gerber next week in vascular surgery clinic. She presents today due to worsening sciatic pain.     Plan/Recs:  - No acute vascular intervention.  - Plan to follow-up in vascular clinic at scheduled appointment next week with Dr. Gerber.   - Ortho/Spine consult for sciatic pain.  - Please contact vascular surgery if patient to remain inpatient, or if there are any further concerns.     Plan discussed with Vascular Surgery Fellow on call    Vascular Surgery  [phone]

## 2023-10-13 NOTE — ED PROVIDER NOTE - CLINICAL SUMMARY MEDICAL DECISION MAKING FREE TEXT BOX
Jefry HURTADO PGY-3: 52-year-old female history of breast cancer status postlumpectomy, DVT previously on Lovenox now on Plavix, hypertension on amlodipine, right SFA occlusion 2019, Presenting with chief complaint of intermittent right lower back pain with radiation to the right lower extremity.   No red flag symptoms to suggest cord compression  such as saddle anesthesia, urinary retention, or leg weakness.   No midline tenderness to palpation, no history of IV drug use to suggest  epidural abscess.   Clinical picture most consistent with sciatica.  Given prior history of DVT, will obtain  right lower extremity duplex ultrasound.   Neurovascularly intact, bilateral and symmetrical DP pulses,   Cap refill less than 2 seconds, low suspicion for recurrence of SFA occlusion.    Will obtain CT imaging of the thoracic and lumbosacral spine given history of malignancy to evaluate for possible mets to the spine. CT, DVT study, pain control, and reassess.  Anticipate discharge if imaging negative and patient's pain is improved.

## 2023-10-13 NOTE — PROGRESS NOTE ADULT - SUBJECTIVE AND OBJECTIVE BOX
HPI:    51 y/o F who follows with Dr. Blackmon in our practice for a history of left breast cancer s/p lumpectomy (2018), RLE artery thrombus (2019, previously on lovenox, now on pletal and plavix), DVT's, renal oncocytoma (s/p ablation), mild secondary thrombocytosis, and iron deficiency anemia who presented for intermittent right lower back pain with radiation to the right lower extremity. Lower extremity duplex with no evidence of DVT. Arterial duplex showed segmental occlusions of the distal superficial femoral artery, the popliteal artery, and the proximal posterior tibial and peroneal arteries. Vascular surgery consulted, did not recommend any intervention.

## 2023-10-13 NOTE — ED PROVIDER NOTE - OBJECTIVE STATEMENT
52-year-old female history of breast cancer status postlumpectomy, DVT previously on Lovenox now on Plavix, hypertension on amlodipine, right SFA occlusion 2019, Presenting with chief complaint of intermittent right lower back pain with radiation to the right lower extremity.  Patient states that she has had this type of pain before, however presently with severe intensity.  Pain is localized to the right lower back rating down into the buttock and posteriorly down the right lower extremity.  No saddle anesthesia, urinary retention, leg weakness.   Denies history of IV drug use,  no fevers or chills.  took 2 Aleve's before prior to arrival.    NKDA

## 2023-10-13 NOTE — CONSULT NOTE ADULT - SUBJECTIVE AND OBJECTIVE BOX
Vascular Surgery Consult    Consulting attending: René Gerber      [facility] per recent office visit 2023:  "52-year-old female with a complicated past medical history presenting to us for evaluation of right lower extremity claudication symptoms.  Patient has a history of breast carcinoma which was diagnosed in 2018. Patient was treated with subsequent treatment with tamoxifen. In  she presented to the hospital with severe ischemia of the right lower extremity and was diagnosed with acute arterial thrombosis of the right SFA. Patient was managed medically and was treated with Lovenox for 1 year with slight improvement of symptoms. Since then the patient has continued to have significant claudication symptoms of the right lower extremity of less than 1 block associated with mild rest pain. Patient has been treated with Plavix since . No history of smoking. No significant cardiac disease. No family history of DVTs."  ?  Patient presents to ED today due to Right hip pain that radiates towards her groin. She reports her RLE pain/claudication is unchanged. She has had some LLE pain recently and was favoring her right leg which she believes has aggravated her known Sciatica. Vascular surgery consulted after arterial duplex findings of segmental occlusions of the distal superficial femoral artery, the popliteal artery, and the proximal posterior tibial and peroneal arteries.        PAST MEDICAL HISTORY:  Malignant neoplasm of left female breast, unspecified estrogen receptor status, unspecified site of breast    Essential hypertension    Obesity    Renal mass    DVT, lower extremity          PAST SURGICAL HISTORY:  Abnormal biopsy result    Previous  section    History of arthroscopy of both knees    History of laparoscopic cholecystectomy    History of lumpectomy of left breast          MEDICATIONS:        ALLERGIES:  citrus (Urticaria)  No Known Drug Allergies        VITALS & I/Os:  Vital Signs Last 24 Hrs  T(C): 36.7 (13 Oct 2023 18:53), Max: 36.7 (13 Oct 2023 06:15)  T(F): 98 (13 Oct 2023 18:53), Max: 98.1 (13 Oct 2023 06:15)  HR: 81 (13 Oct 2023 18:53) (61 - 81)  BP: 159/90 (13 Oct 2023 18:53) (148/93 - 182/69)  BP(mean): --  RR: 20 (13 Oct 2023 18:53) (19 - 20)  SpO2: 96% (13 Oct 2023 18:53) (96% - 100%)    Parameters below as of 13 Oct 2023 18:53  Patient On (Oxygen Delivery Method): room air        I&O's Summary        PHYSICAL EXAM:  General: No acute distress  Respiratory: Nonlabored  Cardiovascular: RRR  Abdominal: Soft, nondistended, nontender. No rebound or guarding. No organomegaly, no palpable mass.  Extremities: Warm  Vascular: B/l Femoral +signals,  - RLE: Peroneal and PT +signals  - LLE: DP/PT palpable      IMAGING:  < from: [facility] Duplex Low Ext Arterial, Ltd, Right (10.13.23 @ 13:40) >    INTERPRETATION:  CLINICAL INFORMATION: Right leg pain. History of   arterial thrombosis in the right leg.    COMPARISON: Right lower extremity arterial duplex study dated 2019.   CTA of the aorta and lower extremities dated 2019.    TECHNIQUE: Grayscale, color and spectral Doppler imaging was performed at   the arteries of the RIGHT lower extremity.    FINDINGS:  Ankle brachial index measures 0.60 on the right and 1.18 on the left.    Plaque Description: There is moderate calcific plaque and hypoechoic soft   plaque.    Waveform Morphology: Monophasic spectral waveforms.    Stenosis/Occlusion: There is segmental occlusion from the superficial   femoral artery in the mid thigh through the popliteal artery, with   segmental reconstitution of the tibioperoneal trunk. The posterior tibial   and peroneal arteries are occluded proximally and both are reconstituted   distally by collaterals.    The peak systolic velocities of the RIGHT lower extremity are as follows:    Distal external iliac artery: 158 cm/s  Common femoral artery: 140 cm/s  Deep femoral artery: 85 cm/s  Superficial femoral artery: 67 cm/s proximal,  128 cm/s mid, occluded   distal  Popliteal artery: Occluded  Tibioperoneal trunk: 33 cm/s  Posterior tibial artery: Occluded proximal,  28 cm/s mid, 28 cm/s distal  Peroneal artery: Occluded proximal,  occluded mid, 14 cm/s distal  Anterior tibial artery: 28 cm/s proximal,  27 cm/s mid, 27 cm/s distal  Dorsalis pedis artery: 8 cm/s    Left lower extremity  Common femoral artery 140 cm/s    IMPRESSION: Segmental occlusions of the distal superficial femoral artery, the   popliteal artery, and the proximal posterior tibial and peroneal arteries.    from: VA Duplex Lower Ext Vein Scan, Right (10.13.23 @ 13:33) >  IMPRESSION: No evidence of right lower extremity deep venous thrombosis.    from: CT Thoracic Spine No Cont (10.13.23 @ 11:34) >  IMPRESSION: Degenerative changes involving the lumbar spine.

## 2023-10-13 NOTE — ED ADULT NURSE NOTE - NSFALLHARMRISKINTERV_ED_ALL_ED

## 2023-10-13 NOTE — PROGRESS NOTE ADULT - ASSESSMENT
53 y/o F w/hx breast cancer, DVT's, renal oncocytoma (s/p ablation), arterial thrombosis of R SFA and known severe peripheral vascular disease presenting with back and leg pain.     - Evaluated by vascular surgery, has known severe peripheral vascular disease w/symptoms of disabling claudication and mild rest pain of RLE, no intervention recommended, continued on plavix and pletal  - Hx DVT's, prior thrombophilia work-up negative, no evidence of DVT now  - Invasive ductal carcinoma of the left breast, stage IA, ER+/OH+/HER2-, treated in 2018 - not on active treatment  - No CBC for review

## 2023-10-13 NOTE — ED ADULT NURSE REASSESSMENT NOTE - NS ED NURSE REASSESS COMMENT FT1
report received from rn donnell Tracey. Pt a/ox4, PT placed on bed pan, urine collected and sent to lab. Pending us

## 2023-10-13 NOTE — ED ADULT TRIAGE NOTE - RESPIRATORY RATE (BREATHS/MIN)
Request for Home Care Physical Therapy orders as follows:    PT eval and treat date:  7/27/21    Continuation frequency =  2 x week x __4__ weeks                Effective date = 7/27/21    Interventions include:    Therapeutic Exercise  Gait Training  Transfer Training  Gait/Balance/Dysfunction  Home Exercise Program  Home Safety Assessment        Therapist:Rochelle Dave PT  Please respond with .HOMECAREAGREE, if you are in agreement. Please sign with your comments and signature, if you are not in agreement.    Request for Home Care Occupational Therapy orders as follows:      OT eval and treat date:  7/27/21    Continuation frequency =  2 x week x 4____ weeks                 Effective date = 7/27/21    Interventions include:    Therapeutic Exercise  ADL training  Adaptive equipment  Home safety assessment  Energy conservation  Home exercise program                   For therapist: Padma Anguiano OT  Please respond with .HOMECAREAGREE, if you are in agreement. Please sign with your comments and signature, if you are not in agreement.       19

## 2023-10-20 ENCOUNTER — APPOINTMENT (OUTPATIENT)
Dept: VASCULAR SURGERY | Facility: CLINIC | Age: 53
End: 2023-10-20
Payer: MEDICAID

## 2023-10-20 ENCOUNTER — APPOINTMENT (OUTPATIENT)
Age: 53
End: 2023-10-20
Payer: MEDICAID

## 2023-10-20 VITALS
OXYGEN SATURATION: 99 % | SYSTOLIC BLOOD PRESSURE: 157 MMHG | HEART RATE: 74 BPM | WEIGHT: 229 LBS | HEIGHT: 66 IN | DIASTOLIC BLOOD PRESSURE: 99 MMHG | BODY MASS INDEX: 36.8 KG/M2

## 2023-10-20 DIAGNOSIS — M54.16 RADICULOPATHY, LUMBAR REGION: ICD-10-CM

## 2023-10-20 DIAGNOSIS — M48.07 SPINAL STENOSIS, LUMBOSACRAL REGION: ICD-10-CM

## 2023-10-20 PROCEDURE — 99214 OFFICE O/P EST MOD 30 MIN: CPT

## 2023-10-20 PROCEDURE — 99203 OFFICE O/P NEW LOW 30 MIN: CPT

## 2023-10-20 PROCEDURE — 93924 LWR XTR VASC STDY BILAT: CPT

## 2024-01-23 NOTE — ED PROVIDER NOTE - CPE EDP CARDIAC NORM
Patient contacted the office to schedule a follow up visit with provider. Patient is now scheduled for 1/29  at 2pm in office.    normal...

## 2024-04-23 NOTE — ED PROVIDER NOTE - EKG ADDITIONAL QUESTION - PERFORMED INDEPENDENT VISUALIZATION
Called Pt with message  
I put an addendum on her note from 4/22/2024  
I spoke with pt and she states she only wears the O2 at night for her sleep apnea   It is set at 2 L     Thank you   
Luis is looking for notes from patient visit. Must have in that using O2 and benefiting from it. Can you please addend your last note so we can fax    Fax [phone]  
Note faxed to [facility]  
She never wears her oxygen here, and her pulse ox is always normal.  Does she only wear it at night?  
Yes

## 2024-05-31 ENCOUNTER — APPOINTMENT (OUTPATIENT)
Dept: VASCULAR SURGERY | Facility: CLINIC | Age: 54
End: 2024-05-31
Payer: MEDICAID

## 2024-05-31 PROCEDURE — 93923 UPR/LXTR ART STDY 3+ LVLS: CPT

## 2024-05-31 PROCEDURE — 99214 OFFICE O/P EST MOD 30 MIN: CPT

## 2024-05-31 NOTE — ASSESSMENT
[FreeTextEntry1] : Patient with complaints of right lower extremity claudication which have slowly improved over the past year or so.  Arterial Dopplers show no significant change to the prior study and considering all her arterial disease is involving the distal SFA, popliteal artery, and tibial vessels this would not explain the right buttock pain.  No limb threatening ischemia  Follow-up in  6 months for repeat PVRs.  Continue Pletal and Plavix

## 2024-12-20 ENCOUNTER — APPOINTMENT (OUTPATIENT)
Dept: VASCULAR SURGERY | Facility: CLINIC | Age: 54
End: 2024-12-20

## 2024-12-20 PROCEDURE — 99213 OFFICE O/P EST LOW 20 MIN: CPT

## 2024-12-20 PROCEDURE — 93923 UPR/LXTR ART STDY 3+ LVLS: CPT

## 2024-12-20 PROCEDURE — G2211 COMPLEX E/M VISIT ADD ON: CPT | Mod: NC

## 2025-08-22 ENCOUNTER — APPOINTMENT (OUTPATIENT)
Dept: VASCULAR SURGERY | Facility: CLINIC | Age: 55
End: 2025-08-22
Payer: MEDICAID

## 2025-08-22 VITALS
DIASTOLIC BLOOD PRESSURE: 95 MMHG | SYSTOLIC BLOOD PRESSURE: 153 MMHG | OXYGEN SATURATION: 98 % | HEART RATE: 76 BPM | HEIGHT: 66 IN

## 2025-08-22 DIAGNOSIS — I73.9 PERIPHERAL VASCULAR DISEASE, UNSPECIFIED: ICD-10-CM

## 2025-08-22 PROCEDURE — 93923 UPR/LXTR ART STDY 3+ LVLS: CPT

## 2025-08-22 PROCEDURE — 99214 OFFICE O/P EST MOD 30 MIN: CPT
